# Patient Record
Sex: FEMALE | Race: WHITE | NOT HISPANIC OR LATINO | URBAN - METROPOLITAN AREA
[De-identification: names, ages, dates, MRNs, and addresses within clinical notes are randomized per-mention and may not be internally consistent; named-entity substitution may affect disease eponyms.]

---

## 2020-10-12 ENCOUNTER — TRANSFERRED RECORDS (OUTPATIENT)
Dept: HEALTH INFORMATION MANAGEMENT | Facility: CLINIC | Age: 60
End: 2020-10-12

## 2020-10-23 ENCOUNTER — TRANSCRIBE ORDERS (OUTPATIENT)
Dept: OTHER | Age: 60
End: 2020-10-23

## 2020-10-23 DIAGNOSIS — L20.9 ATOPIC DERMATITIS: Primary | ICD-10-CM

## 2020-12-08 ENCOUNTER — TELEPHONE (OUTPATIENT)
Dept: DERMATOLOGY | Facility: CLINIC | Age: 60
End: 2020-12-08

## 2020-12-08 ENCOUNTER — OFFICE VISIT (OUTPATIENT)
Dept: ALLERGY | Facility: CLINIC | Age: 60
End: 2020-12-08
Payer: COMMERCIAL

## 2020-12-08 ENCOUNTER — OFFICE VISIT (OUTPATIENT)
Dept: DERMATOLOGY | Facility: CLINIC | Age: 60
End: 2020-12-08
Payer: COMMERCIAL

## 2020-12-08 DIAGNOSIS — L56.8 PHOTODERMATITIS: ICD-10-CM

## 2020-12-08 DIAGNOSIS — L20.9 ATOPIC DERMATITIS, UNSPECIFIED TYPE: ICD-10-CM

## 2020-12-08 DIAGNOSIS — L25.9 CONTACT DERMATITIS, UNSPECIFIED CONTACT DERMATITIS TYPE, UNSPECIFIED TRIGGER: Primary | ICD-10-CM

## 2020-12-08 DIAGNOSIS — M54.9 BACK PAIN, UNSPECIFIED BACK LOCATION, UNSPECIFIED BACK PAIN LATERALITY, UNSPECIFIED CHRONICITY: ICD-10-CM

## 2020-12-08 DIAGNOSIS — L30.9 DERMATITIS: Primary | ICD-10-CM

## 2020-12-08 DIAGNOSIS — Z88.9 DRUG ALLERGY: ICD-10-CM

## 2020-12-08 PROBLEM — Z79.899 CONTROLLED SUBSTANCE AGREEMENT SIGNED: Status: ACTIVE | Noted: 2019-04-04

## 2020-12-08 PROCEDURE — 99202 OFFICE O/P NEW SF 15 MIN: CPT | Performed by: DERMATOLOGY

## 2020-12-08 PROCEDURE — 99214 OFFICE O/P EST MOD 30 MIN: CPT | Performed by: DERMATOLOGY

## 2020-12-08 RX ORDER — TRIAMCINOLONE ACETONIDE 1 MG/G
OINTMENT TOPICAL
COMMUNITY
Start: 2020-10-12 | End: 2024-06-10

## 2020-12-08 RX ORDER — PIMECROLIMUS 10 MG/G
CREAM TOPICAL 2 TIMES DAILY
Qty: 60 G | Refills: 3 | Status: SHIPPED | OUTPATIENT
Start: 2020-12-08 | End: 2024-06-10

## 2020-12-08 RX ORDER — AMITRIPTYLINE HYDROCHLORIDE 100 MG/1
TABLET ORAL
COMMUNITY
Start: 2020-12-08

## 2020-12-08 RX ORDER — CRISABOROLE 20 MG/G
OINTMENT TOPICAL
COMMUNITY
Start: 2020-11-13 | End: 2024-06-10

## 2020-12-08 RX ORDER — FLUOCINOLONE ACETONIDE 0.25 MG/G
OINTMENT TOPICAL
COMMUNITY
Start: 2019-12-28 | End: 2024-06-10

## 2020-12-08 RX ORDER — PREDNISONE 10 MG/1
TABLET ORAL
Qty: 10 TABLET | Refills: 0 | Status: SHIPPED | OUTPATIENT
Start: 2020-12-08 | End: 2020-12-12

## 2020-12-08 RX ORDER — GABAPENTIN 300 MG/1
CAPSULE ORAL
COMMUNITY
Start: 2020-11-04 | End: 2024-06-10

## 2020-12-08 RX ORDER — HYDROCODONE BITARTRATE AND ACETAMINOPHEN 5; 325 MG/1; MG/1
1 TABLET ORAL
COMMUNITY
Start: 2019-09-07 | End: 2024-06-10

## 2020-12-08 RX ORDER — MOMETASONE FUROATE 1 MG/G
CREAM TOPICAL DAILY
Qty: 45 G | Refills: 1 | Status: SHIPPED | OUTPATIENT
Start: 2020-12-08 | End: 2024-06-10

## 2020-12-08 RX ORDER — TACROLIMUS 1 MG/G
OINTMENT TOPICAL
COMMUNITY
Start: 2020-08-05 | End: 2024-06-10

## 2020-12-08 RX ORDER — QUETIAPINE FUMARATE 100 MG/1
TABLET, FILM COATED ORAL
COMMUNITY
Start: 2019-03-27 | End: 2024-06-10

## 2020-12-08 RX ORDER — DUPILUMAB 300 MG/2ML
300 INJECTION, SOLUTION SUBCUTANEOUS
COMMUNITY
Start: 2020-11-13

## 2020-12-08 RX ORDER — ACYCLOVIR 400 MG/1
400 TABLET ORAL
COMMUNITY
Start: 2020-06-30 | End: 2024-06-10

## 2020-12-08 RX ORDER — VALACYCLOVIR HYDROCHLORIDE 500 MG/1
500 TABLET, FILM COATED ORAL
COMMUNITY
End: 2024-06-10

## 2020-12-08 RX ORDER — CELECOXIB 200 MG/1
200 CAPSULE ORAL 2 TIMES DAILY
Qty: 60 CAPSULE | Refills: 1 | Status: SHIPPED | OUTPATIENT
Start: 2020-12-08 | End: 2024-06-10

## 2020-12-08 RX ORDER — TRIAMCINOLONE ACETONIDE 1 MG/G
CREAM TOPICAL
COMMUNITY
Start: 2019-08-13 | End: 2024-06-10

## 2020-12-08 ASSESSMENT — PAIN SCALES - GENERAL
PAINLEVEL: NO PAIN (0)
PAINLEVEL: SEVERE PAIN (7)

## 2020-12-08 NOTE — NURSING NOTE
Allergy Rooming Note    Marialuisa Gongora's goals for this visit include:   Chief Complaint   Patient presents with     Allergy Consult     Marialuisa is here for an allergy consult relating to dermatitis.      Aditi Mukherjee LPN

## 2020-12-08 NOTE — TELEPHONE ENCOUNTER
Patient called back she would like to switch to [facility]    Patient also stated she has a copay card one file.  Will inform pharmacy and see if they can call for info or I will call tomorrow to obtain the copay card info for the pharmacy.

## 2020-12-08 NOTE — PROGRESS NOTES
" Dermato-allergology note    Allergy Problem List:    Specialty Problems     None          CC:   Allergy Consult (Marialuisa is here for an allergy consult relating to dermatitis. )    Encounter Date: Dec 8, 2020    History of Present Illness:  I have reviewed the existing informations with patient personally, in Epic and other sources including the nursing intake corresponding to this issue. Marialuisa Gongora is a 60 year old female who presents to the consult  in person.     Reports that she first noticed the rash back in April 2019. Was on her face and anterior neck. Would come and go. Unclear trigger with intermittent response to topicals. Had a biopsy from the neck which revealed \"9/3/19: spongiotic dermatitis with occasional eosinophils\". Underwent extensive patch testing by Dr. Gamez in December 2019 which revealed no strong or mild reactions and doubtful reactions to cetrimonium chloride, methenamine (formaldehyde), formaldehyde 1% (suspected irritants). Since patch testing, has had fluctuation of symptoms. Had some improvement with prednisone, but required higher doses to maintain. Had initial improvement with dupixent, but now losing efficacy. Notes that back in February, she was in [place] for two weeks and no rash then, but upon return to the [place] due to [place] concerns, she had a recurrence of the rash    Currently, described the rash as red, hot, burning, and associated with pain. Only using water and vaseline. Worsened with eucrisa. Denies any other areas affected by rash. Denies any muscle weakness or difficulty walking up/down stairs. Denies any dysphagia. No new medications    Previous patch testing with Dr. Gamez \"patch testing with 287 allergens, including the NACDG standard series, in addition to our corticosteroid, personal care product (general, shampoo/soaps), preservatives, emulsifiers, acrylates, hairdressing series, perfumes, sunscreen panels, as well as select other allergens and personal " "products.\" [place] codes: 275LV8GKK and [id number]    Past Medical History:   Patient Active Problem List   Diagnosis     Controlled substance agreement signed     Spinal stenosis of lumbar region     S/P lumbar spinal fusion     Past Medical History:   Diagnosis Date     Displacement of cervical intervertebral disc      Elevated BP without diagnosis of hypertension      Herpes labialis 04/01/2016     History of anorexia nervosa      History of bilateral mastectomy      Infiltrating ductal carcinoma of left breast (H) 2012     Lyme disease     2012     Pneumothorax, right 12/01/2016       Allergy History:     Allergies   Allergen Reactions     Codeine Itching     Baclofen Other (See Comments)     Minocycline      Penicillins Hives     5-23 had hives as a childhood rxn - HAS HAD CSN in the past without problem  THROAT SWELLS AND HIVES       Sulfa Drugs Unknown       Social History:  The patient works. Patient has the following hobbies or non-occupational exposure: has gone to [facility]     reports that she has never smoked. She has never used smokeless tobacco.      Family History:  No family history on file.    Medications:  Current Outpatient Medications   Medication Sig Dispense Refill     HYDROcodone-acetaminophen (NORCO) 5-325 MG tablet Take 1 tablet by mouth       progesterone (PROMETRIUM) 200 MG capsule        triamcinolone (KENALOG) 0.1 % external cream        acyclovir (ZOVIRAX) 400 MG tablet Take 400 mg by mouth       amitriptyline (ELAVIL) 100 MG tablet        Calcium-Magnesium-Vitamin D 400-166.7-133.3 MG-MG-UNIT TABS        DUPIXENT 300 MG/2ML syringe        EUCRISA 2 % ointment SEBLE EXT AA BID       fluocinolone (SYNALAR) 0.025 % ointment SEBLE AA BID       gabapentin (NEURONTIN) 300 MG capsule TK ONE C PO  BID AND 2 CS PO ONCE D FOR TOTAL DOSE OF 4 CS DAILY       QUEtiapine (SEROQUEL) 100 MG tablet TK SS TO ONE T PO D       tacrolimus (PROTOPIC) 0.1 % external ointment APPLY EXTERNALLY BID       triamcinolone " (KENALOG) 0.1 % external ointment SEBLE EXT TO ARMS BID FOR 14 DAYS       valACYclovir (VALTREX) 500 MG tablet Take 500 mg by mouth         Review of Systems:  -As per HPI  -Constitutional: The patient denies fatigue, fevers, chills, unintended weight loss, and night sweats.  -HEENT: Patient denies nonhealing oral sores.  -Skin: As above in HPI. No additional skin concerns.    Physical exam:  Vitals: There were no vitals taken for this visit.  GEN: This is a well developed, well-nourished female in no acute distress, in a pleasant mood.    Skin: Focused examination of the face within the consultation was performed.   - ill-defined erythema affecting face and neck with some thickening affecting the upper eyelids (left worse than right)  - No additional skin lesions of concern  - upper respiratory tract: currently no obvious Rhinitis  - lungs: no signs for active and present Asthma/Wheezing or coughing  - eyes: currently no active conjunctivits  - GI system/digestion: currently no problems, no bloating or Diarrhoea                Allergy Tests:    Past Allergy Test    Future Allergy Test: 12/8/2020     Order for PATCH TESTS    [x] Outpatient  [] Inpatient: James..../ Bed ....      Skin Atopy (atopic dermatitis) [] Yes   [x] No  Contact allergies:   [] Yes   [x] No  Urticaria/Angioedema  [] Yes   [x] No  Rhinitis/Sinusitis:   [] Yes   [x] No   [] seasonal [] perennial    [] plants?   [] grasses?   [] other?  Allergic Asthma:   [] Yes   [x] No  Pets :                 [x] Yes   [] No - dog  Food Allergy:   [] Yes   [x] No  Drug allergies:               [] Yes   [] No - maybe  Leg ulcers:   [] Yes   [x] No  Hand eczema:   [] Yes   [x] No   Leading hand:     [x] R       [] L       [] Ambidextrous           Reason for tests (suspected allergy): diagnosis of facial erythema (airborne vs photo)  Known previous allergies: no anaphylaxis allergens  Standardized panels  [x] Standard panel (40 tests)  [] Preservatives &  Antimicrobials (31 tests)  [] Emulsifiers & Additives (25 tests)   [x] Perfumes/Flavours & Plants (25 tests)  [] Hairdresser panel (12 tests)  [] Rubber Chemicals (22 tests)  [] Plastics (26 tests)  [] Colorants/Dyes/Food additives (20 tests)  [] Metals (implants/dental) (24 tests)  [x] Local anaesthetics/NSAIDs (13 tests) ==> NSAIDs only and do in double with and without UVA exposure  [] Antibiotics & Antimycotics (14 tests)   [] Corticosteroids (15 tests)   [x] Photopatch test (62 tests)   [x] others:  Seroquel, Gabapentin, Amitryptilin, Progesteron with and without UV exposure      [x] Patient's own products: eucrisa, elidel, ibuprofen/acetaminophen without exposure to UVA    DO NOT test if chemical or biological identity is unknown!     always ask from patient the product information and safety sheets (MSDS)   [x] Atopy screen prick test (Atopic predisposition): ...    Standardized prick panels  [x] Atopic panel (20 tests)  [] Pediatric Panel (12 tests)  [] Milk, Meat, Eggs, Grains (20 tests)   [] Dust, Epithelia, Feathers (10 tests)  [] Fish, Seafood, Shellfish (17 tests)  [] Nuts, Beans (14 tests)  [] Spice, Vegetable, Fruit (17 tests)  [] Others: ...      [] Patient's own products: ...    DO NOT test if chemical or biological identity is unknown!     always ask from patient the product information and safety sheets (MSDS)     Standardized intradermal tests  [x] Penicillium notatum [x] Aspergillus fumigatus [x] House dust mites  [] Bee venom   [] Wasp venom !!Specific protocol with dilutions!!  [] Others: ...    [] Patient needs consultation with Allergy team (changes of tests may apply)  [] Tests discussed with Allergy team (can have direct appointment for allergy tests)     Impression/Plan:    1. 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 Possible atopic predisposition  Based off H&P, presenting condition does appear consistent with an allergic contact dermatitis (photo  "vs airborne). Will plan for patch testing for these conditions with atopic prick/intradermal screening. Will also test patient's medication  Plan   - Will plan for testing (see above)    - Recommended to also bring home medications   - Prescribed short prednisone taper    40mg x1d -> 30mg x1d -> 20mg x1d -> 10mg x1d   - Prescribed mometasone cream daily x5d to rash on face    - Recommended to transition to elidel Mon-Fri and use mometasone on weekends   - Refilled elidel cream    - Recommended to use BID Monday thru Friday after 5 days of mometasone   - Refilled dupixent   - Prescribed compound pharmacy - [facility]      - \"COPPER SULFATE 1%, ZINC SULFATE 1.5% AND CAMPHOR ANTISEPTIC(FCP-8700)    - Dilute stock solution 1:10 in water, keep diluted solution in fridge and use 1-2x daily for compresses   - Photodocumentation obtained in clinic   - Prescribed celecoxib 200mg BID PRN for low back pain    Follow-up: in Derm-Allergy clinic in 1 month    Thank you for the opportunity be involved in the care of this patient.     Staff: Aditi Mukherjee LPN & Marialuisa Liu and Bertha Amin RN    Patient was seen and staffed with attending physician, Dr. Waldrop, who was present the entire visit    Joe Ruiz MD  Med/Derm PGY-3  P: [phone]    Staff attestation:  Staff Physician Comments:  I saw and evaluated the patient with the resident and I agree with the assessment and plan as documented in the resident's note.    Ziyad Stallworth MD  Professor  Head of Dermato-Allergy Division  Department of Dermatology  [facility]      I spent a total of 34 minutes face to face with Marialuisa Gongora during today s office visit. Over 50% of this time was spent counseling the patient and/or coordinating care.  Please see Assessment and Plan for details.    "

## 2020-12-08 NOTE — LETTER
12/8/2020       RE: Marialuisa Gongora  [address]  [place] [address]     Dear Colleague,    Thank you for referring your patient, Marialuisa Gongora, to the [facility] [place] at [facility] Please see a copy of my visit note below.    [facility] Dermatology Note      Dermatology Problem List:  1. Allergic contact dermatitis  - Currently using Dupixent, started in June 2020  - Followed by Dr. Worley with allergy  - In the past used mupirocin, triamcinolone, Eucrisa, Tacrolimus, Pimecrolimus   - Patch testing doubtful reactions to cetrimonium chloride, methenamine (formaldehyde), formaldehyde 1%   2. Positive lyme titer  - On doxycyline 100 mg twice daily for 1 month (s/p 1 week of treatment 12/8/2020)  3. Punch biopsy right lateral neck 9/3/19 spongiotic dermatitis with occasional eosinophils. (outside hospital)    Encounter Date: Dec 8, 2020    CC:  Chief Complaint   Patient presents with     Derm Problem     Marialuisa is here today for a possible rash on her face/neck          History of Present Illness:  Ms. Marialuisa Gongora is a 60 year old female who presents for evaluation of a rash on her face.  She reports that this appeared over a year ago on April 2019 as dry patches on the lower face.  She would treat these with Vaseline and hydrocortisone with intermittent improvement.  At first this rash would come and go but now is always present.  It appears like a sunburn on her face and anterior neck and is very painful.  She notes that her eyes will be swollen shut in the morning, and the rash is intermittently hot almost like a hot flash.She would develop occasional patches on her hands but she does not get any other areas of rash on her body.  She does report fatigue and joint pain that is worse in the morning but denies fever. She denies difficulty swallowing or that there is a family history of autoimmune conditions. She  does not notice any predictability with seasons or exposures that she can pinpoint.  She first saw her GP, who thought it was fungal and prescribed a fungal cream.  However, this was not helpful.  She then went to a dermatologist who thought it was bacterial and prescribed mupirocin, but this was also not helpful for her rash.  She then had TRUE patch testing and patch testing at the [facility] with Dr Gamez, which are listed below  Punch biopsy of the right lateral neck on 9/3/19 demonstrated spongiotic dermatitis. In the past she has also tried Eucrisa, tacrolimus, Pimecrolimus, and triamcinolone, none of which have improved her rash. She denies a personal or family history of seasonal allergies.     Her current flare started past Thursday (12/3/2020).She has a prescription for prednisone 40 mg for this current flare but has not taken this in lieu of her visit here with us today.She notes that she previously took 20 mg of prednisone for previous flares, but this is no longer helpful and has had to increase dose.   Notably, she is currently on doxycycline started 1 week ago for a positive Lyme titer.  TENZIN and CBC performed at that visit were otherwise WNL. She is followed by her allergist, Dr. Worley, who prescribed Dupixent injections every 2 months, starting in June of 2020.  Previously this was helpful for her but now she has had recurrence of rash after Dupixent injections.  She will receive her last injection this coming Friday and will stop. Dr. Worley recommends starting Cyclosporine in the future.        Her current skin care regimen includes Eucerin body wash in the shower, water to wash her face, Vaseline on the face, and CeraVe cream on the body.  She uses a cream rinse and shampoo from her approved list from patch testing.  She gets her hair dyed every 5 weeks, gel nails every 2 weeks, and eyelash extensions at the salon.  She does report that she has not reacted in the past of  these products.    Notably, she reports that this rash is greatly affecting the quality of her life.    The patch testing results were as follows:   Strong / Very Strong Positive Reactions: none   Mild Positive Reactions: none   Doubtful Reactions (possibly irritant): cetrimonium chloride, methenamine (formaldehyde), formaldehyde 1%   Previous TRUE test reaction: hydrocortisone-17-butyrate    Punch biopsy, right lateral neck, 9/3/19: Spongiotic dermatitis.  Histologic sections show a punch biopsy of skin with parakeratosis overlying a spongiotic and mildly acanthotic epidermis. There is exocytosis of lymphocytes, and in the dermis, there is a perivascular infiltrate of lymphocytes with occasional eosinophils. The findings are consistent with an eczematous process.       Past Medical History:   There is no problem list on file for this patient.  History of breast cancer, 9 years in remission, doing well.     History reviewed. No pertinent past medical history.  History reviewed. No pertinent surgical history.    Social History:  Patient reports that she has never smoked. She has never used smokeless tobacco. Recently retired from medical software. Works out 5-6 days a week (runs, bikes, lifts). Takes care of granddaughter. Will be renting a house in [place] for February for two months.    Family History:  History reviewed. No pertinent family history.    Medications:  Current Outpatient Medications   Medication Sig Dispense Refill     acyclovir (ZOVIRAX) 400 MG tablet Take 400 mg by mouth       amitriptyline (ELAVIL) 100 MG tablet        Calcium-Magnesium-Vitamin D 400-166.7-133.3 MG-MG-UNIT TABS        DUPIXENT 300 MG/2ML syringe        gabapentin (NEURONTIN) 300 MG capsule TK ONE C PO  BID AND 2 CS PO ONCE D FOR TOTAL DOSE OF 4 CS DAILY       QUEtiapine (SEROQUEL) 100 MG tablet TK SS TO ONE T PO D       Allergies   Allergen Reactions     Codeine Itching     Baclofen Other (See Comments)     Minocycline       Penicillins Hives     5-23 had hives as a childhood rxn - HAS HAD CSN in the past without problem  THROAT SWELLS AND HIVES       Sulfa Drugs Unknown         Review of Systems:  -Constitutional: Otherwise feeling well today, in usual state of health.  -Skin: As above in HPI. No additional skin concerns.  -As per HPI    Physical exam:  Vitals: There were no vitals taken for this visit.  GEN: This is a well developed, well-nourished female in no acute distress, in a pleasant mood.    SKIN: Focused examination of the face, neck, upper chest, and bilateral hands was performed.  - Shiny red plaques and patches on face and anterior neck including eyelids  -No other lesions of concern on areas examined.       Impression/Plan:  1. Allergic contact dermatitis  - Counseled her that based on her appearance today, her rash appears to continue to be consistent with a severe allergic contact dermatitis.  - Prior to starting prednisone or Cyclosporine, consult Dr. Stallworth today at 10:30.      CC Marisa Worley MD  [facility]  [address] [place] [address] on close of this encounter.  Follow up to be determined after Dr. Stallworth's appointment today    Staff Involved:  I, Alisson Byrnes, MS3, saw and examined the patient in the presence of Dr. Canseco.       I was present with the medical student who participated in the service and in the documentation of the note. I have verified the history and personally performed the physical exam and medical decision making. I agree with the assessment and plan of care as documented in the note.  Ale Canseco MD

## 2020-12-08 NOTE — PROGRESS NOTES
[facility] Dermatology Note      Dermatology Problem List:  1. Allergic contact dermatitis  - Currently using Dupixent, started in June 2020  - Followed by Dr. Worley with allergy  - In the past used mupirocin, triamcinolone, Eucrisa, Tacrolimus, Pimecrolimus   - Patch testing doubtful reactions to cetrimonium chloride, methenamine (formaldehyde), formaldehyde 1%   2. Positive lyme titer  - On doxycyline 100 mg twice daily for 1 month (s/p 1 week of treatment 12/8/2020)  3. Punch biopsy right lateral neck 9/3/19 spongiotic dermatitis with occasional eosinophils. (outside hospital)    Encounter Date: Dec 8, 2020    CC:  Chief Complaint   Patient presents with     Derm Problem     Marialuisa is here today for a possible rash on her face/neck          History of Present Illness:  Ms. Marialuisa Gongora is a 60 year old female who presents for evaluation of a rash on her face.  She reports that this appeared over a year ago on April 2019 as dry patches on the lower face.  She would treat these with Vaseline and hydrocortisone with intermittent improvement.  At first this rash would come and go but now is always present.  It appears like a sunburn on her face and anterior neck and is very painful.  She notes that her eyes will be swollen shut in the morning, and the rash is intermittently hot almost like a hot flash.She would develop occasional patches on her hands but she does not get any other areas of rash on her body.  She does report fatigue and joint pain that is worse in the morning but denies fever. She denies difficulty swallowing or that there is a family history of autoimmune conditions. She does not notice any predictability with seasons or exposures that she can pinpoint.  She first saw her GP, who thought it was fungal and prescribed a fungal cream.  However, this was not helpful.  She then went to a dermatologist who thought it was bacterial and prescribed mupirocin, but this was also not  helpful for her rash.  She then had TRUE patch testing and patch testing at the [facility] with Dr Gamez, which are listed below  Punch biopsy of the right lateral neck on 9/3/19 demonstrated spongiotic dermatitis. In the past she has also tried Eucrisa, tacrolimus, Pimecrolimus, and triamcinolone, none of which have improved her rash. She denies a personal or family history of seasonal allergies.     Her current flare started past Thursday (12/3/2020).She has a prescription for prednisone 40 mg for this current flare but has not taken this in lieu of her visit here with us today.She notes that she previously took 20 mg of prednisone for previous flares, but this is no longer helpful and has had to increase dose.   Notably, she is currently on doxycycline started 1 week ago for a positive Lyme titer.  TENZIN and CBC performed at that visit were otherwise WNL. She is followed by her allergist, Dr. Worley, who prescribed Dupixent injections every 2 months, starting in June of 2020.  Previously this was helpful for her but now she has had recurrence of rash after Dupixent injections.  She will receive her last injection this coming Friday and will stop. Dr. Worley recommends starting Cyclosporine in the future.        Her current skin care regimen includes Eucerin body wash in the shower, water to wash her face, Vaseline on the face, and CeraVe cream on the body.  She uses a cream rinse and shampoo from her approved list from patch testing.  She gets her hair dyed every 5 weeks, gel nails every 2 weeks, and eyelash extensions at the salon.  She does report that she has not reacted in the past of these products.    Notably, she reports that this rash is greatly affecting the quality of her life.    The patch testing results were as follows:   Strong / Very Strong Positive Reactions: none   Mild Positive Reactions: none   Doubtful Reactions (possibly irritant): cetrimonium chloride, methenamine  (formaldehyde), formaldehyde 1%   Previous TRUE test reaction: hydrocortisone-17-butyrate    Punch biopsy, right lateral neck, 9/3/19: Spongiotic dermatitis.  Histologic sections show a punch biopsy of skin with parakeratosis overlying a spongiotic and mildly acanthotic epidermis. There is exocytosis of lymphocytes, and in the dermis, there is a perivascular infiltrate of lymphocytes with occasional eosinophils. The findings are consistent with an eczematous process.       Past Medical History:   There is no problem list on file for this patient.  History of breast cancer, 9 years in remission, doing well.     History reviewed. No pertinent past medical history.  History reviewed. No pertinent surgical history.    Social History:  Patient reports that she has never smoked. She has never used smokeless tobacco. Recently retired from medical software. Works out 5-6 days a week (runs, bikes, lifts). Takes care of granddaughter. Will be renting a house in [place] for February for two months.    Family History:  History reviewed. No pertinent family history.    Medications:  Current Outpatient Medications   Medication Sig Dispense Refill     acyclovir (ZOVIRAX) 400 MG tablet Take 400 mg by mouth       amitriptyline (ELAVIL) 100 MG tablet        Calcium-Magnesium-Vitamin D 400-166.7-133.3 MG-MG-UNIT TABS        DUPIXENT 300 MG/2ML syringe        gabapentin (NEURONTIN) 300 MG capsule TK ONE C PO  BID AND 2 CS PO ONCE D FOR TOTAL DOSE OF 4 CS DAILY       QUEtiapine (SEROQUEL) 100 MG tablet TK SS TO ONE T PO D       Allergies   Allergen Reactions     Codeine Itching     Baclofen Other (See Comments)     Minocycline      Penicillins Hives     5-23 had hives as a childhood rxn - HAS HAD CSN in the past without problem  THROAT SWELLS AND HIVES       Sulfa Drugs Unknown         Review of Systems:  -Constitutional: Otherwise feeling well today, in usual state of health.  -Skin: As above in HPI. No additional skin concerns.  -As  per HPI    Physical exam:  Vitals: There were no vitals taken for this visit.  GEN: This is a well developed, well-nourished female in no acute distress, in a pleasant mood.    SKIN: Focused examination of the face, neck, upper chest, and bilateral hands was performed.  - Shiny red plaques and patches on face and anterior neck including eyelids  -No other lesions of concern on areas examined.       Impression/Plan:  1. Allergic contact dermatitis  - Counseled her that based on her appearance today, her rash appears to continue to be consistent with a severe allergic contact dermatitis.  - Prior to starting prednisone or Cyclosporine, consult Dr. Stallworth today at 10:30.      CC Marisa Worley MD  [facility] ALLERGY AND ASTHMA  [address] [place] [address] on close of this encounter.  Follow up to be determined after Dr. Stallworth's appointment today    Staff Involved:  I, Alisson Byrnes, MS3, saw and examined the patient in the presence of Dr. Canseco.       I was present with the medical student who participated in the service and in the documentation of the note. I have verified the history and personally performed the physical exam and medical decision making. I agree with the assessment and plan of care as documented in the note.  Ale Canseco MD

## 2020-12-08 NOTE — NURSING NOTE
Dermatology Rooming Note    Marialuisa Gongora's goals for this visit include:   Chief Complaint   Patient presents with     Derm Problem     Marialuisa is here today for a possible rash on her face/neck      NITESH Santiago

## 2020-12-08 NOTE — TELEPHONE ENCOUNTER
Prior Authorization Not Needed per Insurance    Medication: Dupixent- No PA Needed  Insurance Company: [employer] ([facility]) - Phone [phone] Fax [phone]  Expected CoPay:      Pharmacy Filling the Rx: [facility] - [place], [place] [facility] [address] [facility]  Pharmacy Notified:    Patient Notified:      Per test claim $60.00 copay, patient has been filling with [employer].  Left message for patient to inform she is eligible to fill with our [facility] and see if she would like to fill with [facility] instead.  Order is in [facility] will release tomorrow to [employer] if she does not call back.

## 2020-12-11 ENCOUNTER — TELEPHONE (OUTPATIENT)
Dept: DERMATOLOGY | Facility: CLINIC | Age: 60
End: 2020-12-11

## 2020-12-11 DIAGNOSIS — Z88.9 DRUG ALLERGY: Primary | ICD-10-CM

## 2020-12-11 DIAGNOSIS — L20.89 OTHER ATOPIC DERMATITIS: ICD-10-CM

## 2020-12-11 RX ORDER — GABAPENTIN 100 MG/1
CAPSULE ORAL
Qty: 1 CAPSULE | Refills: 0 | Status: SHIPPED | OUTPATIENT
Start: 2021-01-11

## 2020-12-11 RX ORDER — AMITRIPTYLINE HYDROCHLORIDE 10 MG/1
TABLET ORAL
Qty: 1 TABLET | Refills: 0 | Status: SHIPPED | OUTPATIENT
Start: 2021-01-11

## 2020-12-11 RX ORDER — QUETIAPINE FUMARATE 200 MG/1
TABLET, FILM COATED ORAL
Qty: 1 TABLET | Refills: 0 | Status: SHIPPED | OUTPATIENT
Start: 2021-01-11 | End: 2024-06-10

## 2020-12-11 NOTE — TELEPHONE ENCOUNTER
Standardized panels  [x]? Standard panel (40 tests)  []? Preservatives & Antimicrobials (31 tests)  []? Emulsifiers & Additives (25 tests)   [x]? Perfumes/Flavours & Plants (25 tests)  []? Hairdresser panel (12 tests)  []? Rubber Chemicals (22 tests)  []? Plastics (26 tests)  []? Colorants/Dyes/Food additives (20 tests)  []? Metals (implants/dental) (24 tests)  [x]? Local anaesthetics/NSAIDs (13 tests) ==> NSAIDs only and do in double with and without UVA exposure  []? Antibiotics & Antimycotics (14 tests)   []? Corticosteroids (15 tests)   [x]? Photopatch test (62 tests)   [x]? others:  Seroquel, Gabapentin, Amitryptilin, Progesteron with and without UV exposure                      [x]? Patient's own products: eucrisa, elidel, ibuprofen/acetaminophen without exposure to UVA    DO NOT test if chemical or biological identity is unknown!     always ask from patient the product information and safety sheets (MSDS)   [x]? Atopy screen prick test (Atopic predisposition): ...  Standardized prick panels  [x]? Atopic panel (20 tests)  []? Pediatric Panel (12 tests)  []? Milk, Meat, Eggs, Grains (20 tests)   []? Dust, Epithelia, Feathers (10 tests)  []? Fish, Seafood, Shellfish (17 tests)  []? Nuts, Beans (14 tests)  []? Spice, Vegetable, Fruit (17 tests)  []? Others: ...                      []? Patient's own products: ...    DO NOT test if chemical or biological identity is unknown!     always ask from patient the product information and safety sheets (MSDS)   Standardized intradermal tests  [x]? Penicillium notatum           [x]? Aspergillus fumigatus        [x]? House dust mites  []? Bee venom                         []? Wasp venom          !!Specific protocol with dilutions!!  []? Others: ...  []? Patient needs consultation with Allergy team (changes of tests may apply)  []? Tests discussed with Allergy team (can have direct appointment for allergy tests)   Impression/Plan:  1. Drug-induced photodermatitis, possible  "DDx photoallergic contact dermatitis  2. Possible atopic predisposition  Based off H&P, presenting condition does appear consistent with an allergic contact dermatitis (photo vs airborne). Will plan for patch testing for these conditions with atopic prick/intradermal screening. Will also test patient's medication  Plan              - Will plan for testing (see above)                          - Recommended to also bring home medications              - Prescribed short prednisone taper                          40mg x1d -> 30mg x1d -> 20mg x1d -> 10mg x1d              - Prescribed mometasone cream daily x5d to rash on face                          - Recommended to transition to elidel Mon-Fri and use mometasone on weekends              - Refilled elidel cream                          - Recommended to use BID Monday thru Friday after 5 days of mometasone              - Refilled dupixent              - Prescribed compound pharmacy - [facility]                            - \"COPPER SULFATE 1%, ZINC SULFATE 1.5% AND CAMPHOR ANTISEPTIC([id number])                          - Dilute stock solution 1:10 in water, keep diluted solution in fridge and use 1-2x daily for compresses              - Photodocumentation obtained in clinic              - Prescribed celecoxib 200mg BID PRN for low back pain  Follow-up: in Derm-Allergy clinic in 1 month        STANDARD Series      No Substance 2 days 4 days remarks   1 Ganga Mix [C] - -    2 Colophony - -    3  2-Mercaptobenzothiazole  - -     4 Methylisothiazolinone - -    5 Carba Mix - -    6 Thiuram Mix [A] - -    7 Bisphenol A Epoxy Resin - -    8 N-Vwhx-Rejlfliabbq-Formaldehyde Resin - -    9 Mercapto Mix [A] - -    10 Black Rubber Mix- PPD [B] - -    11 Potassium Dichromate  -  -    12 Balsam of Peru (Myroxylon Pereirae Resin) - -    13 Nickel Sulphate Hexahydrate - -    14 Mixed Dialkyl Thiourea - -    15 Paraben Mix [B] - -    16 Methyldibromo Glutaronitrile - -    17 Fragrance " Mix - -    18 2-Bromo-2-Nitropropane-1,3-Diol (Bronopol) - -    19 Lyral - -    20 Tixocortol-21- Pivalate - -    21 Diazolidiyl Urea (Germall II) - -    22 Methyl Methacrylate - -    23 Cobalt (II) Chloride Hexahydrate - -    24 Fragrance Mix II  - -    25 Compositae Mix - -    26 Benzoyl Peroxide - -    27 Bacitracin - -    28 Formaldehyde - -    29 Methylchloroisothiazolinone / Methylisothiazolinone - -    30 Corticosteroid Mix - -    31 Sodium Lauryl Sulfate - -    32 Lanolin Alcohol - -    33 Turpentine - -    34 Cetylstearylalcohol - -    35 Chlorhexidine Dicluconate - -    36 Budenoside - -    37 Imidazolidinyl Urea  - -    38 Ethyl-2 Cyanoacrylate - -    39 Quaternium 15 (Dowicil 200) - -    40 Decyl Glucoside - -      PERFUMES, FLAVORS & PLANTS      No Substance 2 days 4 days remarks   41 1 Benzyl Cinnamate - -    42 2 Di-Limonene (Dipentene) - -    43 3 Cananga Odorata (Alphonse Cunha) (I) - -    44 4 Lichen Acid Mix - -    45 5 Mentha Piperita Oil (Peppermint Oil) - -    46 6 Sesquiterpenelactone mix - -    47 7 Tea Tree Oil, Oxidized - -    48 8 Wood Tar Mix - -    49 9 Abietic Acid - -    50 10 Lavendula Angustifolia Oil (Lavender Oil) - -    51 11 Camphor  - -      Fragrance Mix I      52 12 Oakmoss Absolute - -    53 13 Eugenol - -    54 14 Geraniol - -    55 15 Hydroxycitronellal - -    56 16 Isoeugenol - -    57 17 Cinnamic Aldehyde - -    58 18 Cinnamic Alcohol  - -      Fragrance mix II      59 19 Citronellol - -    60 20 Alpha-Hexylcinnamic Aldehyde    - -    61 21 Citral - -    62 22 Farnesol - -    63 23 Coumarin - -      LOCAL ANESTHETICS / NSAIDs    No Substance 2 days 4 days remarks     NSAIDs      64 8 Diclofenac - -    65 9 Ibuprofen - -    66 10 Ketoprofen - -    67 11 Paracetamol - -    68 12 Acetylsalicylic acid - -    69 13 Peroxicam      70 14 Naproxen - -      PHOTOPATCH tests (unexposed)      No Substance 2 days 4 days remarks   71 1 3,4,4'-Triclocarban - -    72 2 Coumarin - -    73 3  Bithiniol - -    74 4 Usnic Acid - -    75 5 Compositae Mix - -    76 6 Wood Tar Mix - -    77 7 Balsam of Peru (Myroxylon Pereirae Resin) - -    78 8 Hexachlorophene - -    79 9 Chlorhexidine Digluconate - -    80 10 Triclosan - -    81 11 Fragrance Mix - -    82 12 Ketoprofen  - -    83 13 Lichen Acid Mix - -    84 14 Musk Ambrette - -      Sunscreens (UV filters)  -    85 15 P-Aminobenzoic Acid - -    86 16 2-Hydroxy-4-Methoxy Benzophenone (Oxybenzone) - -    87 17 Tert-Butyl-Methoxydibenzoyl Methane  - -    88 18 3-4-Mehyl Benzyliden Camphor  - -    89 19 2-Ethylhexyl-P (Dimethylamino) Benzoate - -    90 20 7-Lgnaeypa-9-Methoxy-4-Methyl Benzophenone - -    91 21 Benzyl Salicylate - -    92 22 Isoamyl-4-Methoxycinnamate - -    93 23 Phenyl Benzimidazole - 5 - Sulfonic Acid - -    94 24 1-Yiwxqwseogcuz-1-Hydroxybenzoyl-Benzoic Acid Hexyl Melinda - -    95 25 Menthyl Anthranilate (Meradimate) - -    96 26 Bis-Ethylhexyloxyphenol-Methoxy Phenyl Triazine - -    97 27 Diethylhexyl Butamido Triazone - -    98 28 Disodium Phenyldibenzimidazole Tetrasulfonate - -    99 29 Octocrylene - -    100 30 Octyl Triazone - -    101 31 Tinsorb (Methylene - Bis - Benzotriazyl Tetramethylbutylphenol) - -        Patch Tests   as is  as is 1:2 paraffin 1:2 paraffin       Substance Conc  days  days  days  days remarks   102 103 Seroquel         104 105 Gabapentin         106 107 Amitryptilin         108 109 Progesterone                      OTHER PRODUCTS      No Substance Conc  % solv 2 days 4 days remarks   110 1           PHOTOPATCH tests (exposed)      No Substance 2 days 4 days remarks   111 1 3,4,4'-Triclocarban - -    112 2 Coumarin - -    113 3 Bithiniol - -    114 4 Usnic Acid - -    115 5 Compositae Mix - -    116 6 Wood Tar Mix - -    117 7 Balsam of [place] (Myroxylon Pereirae Resin) - -    118 8 Hexachlorophene - -    119 9 Chlorhexidine Digluconate - -    120 10 Triclosan - -    121 11 Fragrance Mix - -    122 12 Ketoprofen  -  -    123 13 Lichen Acid Mix - -    124 14 Musk Ambrette - -      Sunscreens (UV filters)  -    125 15 P-Aminobenzoic Acid - -    126 16 2-Hydroxy-4-Methoxy Benzophenone (Oxybenzone) - -    127 17 Tert-Butyl-Methoxydibenzoyl Methane  - -    128 18 3-4-Mehyl Benzyliden Camphor  - -    129 19 2-Ethylhexyl-P (Dimethylamino) Benzoate - -    130 20 3-Zjljesns-0-Methoxy-4-Methyl Benzophenone - -    131 21 Benzyl Salicylate - -    132 22 Isoamyl-4-Methoxycinnamate - -    133 23 Phenyl Benzimidazole - 5 - Sulfonic Acid - -    134 24 0-Uorhrpjdoyhkz-5-Hydroxybenzoyl-Benzoic Acid Hexyl Melinda - -    135 25 Menthyl Anthranilate (Meradimate) - -    136 26 Bis-Ethylhexyloxyphenol-Methoxy Phenyl Triazine - -    137 27 Diethylhexyl Butamido Triazone - -    138 28 Disodium Phenyldibenzimidazole Tetrasulfonate - -    139 29 Octocrylene - -    140 30 Octyl Triazone - -    141 31 Tinsorb (Methylene - Bis - Benzotriazyl Tetramethylbutylphenol) - -    Procedure: Apply the same photopatch series 2  times on the back. Remove one patch side for irradiation after 1 day and then irradiate this site with 10 J/cm2 UVA. Remove the other, non-irradiated patch sites on day 2.   Maybe perform on these patients as well standardized UV-B and UV-A MED tests.      Patch Tests   as is  as is 1:2 paraffin 1:2 paraffin       Substance Conc  days  days  days  days remarks   142 143 Seroquel         144 145 Gabapentin         146 147 Amitryptilin         148 149 Progesterone                      OTHER PRODUCTS      No Substance Conc  % solv 2 days 4 days remarks   150 1           Results of patch tests:                         Interpretation:    - Negative                    A    = Allergic      (+) Erythema    TI   = Toxic/irritant   + E + Infiltration    RaP = Relevance at Present     ++ E/I + Papulovesicle   Rpr  = Relevance Previously     +++ E/I/P + Blister     nR   = No Relevance          Atopy Screen     No Substance Readings (15 min) Evaluation   POS  Histamine 1mg/ml -    NEG NaCl 0.9% -      No Substance Readings (15 min) Evaluation   1 Alternaria alternata (tenuis)  -    2 Cladosporium herbarum -    3 Aspergillus fumigatus -    4 Penicillium notatum -    5 Dermatophagoides pteronyssinus -    6 Dermatophagoides farinae -    7 Dog epithelium (canis spp) -    8 Cat hair (bridget catus) -    9 Cockroach   (Blatella americana & germanica) -    10 Grass mix midwest   (Deedee, Orchard, Redtop, Joe) -    11 Raul grass (sorghum halepense) -    12 Weed mix   (common Cocklebur, Lamb s quarters, rough redroot Pigweed, Dock/Sorrel) -    13 Mug wort (artemisia vulgare) -    14 Ragweed giant/short (ambrosia spp) -    15 English Plantain (plantago lanceolata) -    16 Tree mix 1 (Pecan, Maple BHR, Oak RVW, american Marmaduke, black [place]) -    17 Red cedar (juniperus virginia) -    18 Tree mix 2   (white Giorgi, river/red Birch, black Brewster, common [place], american Elm) -    19 Box elder/Maple mix (acer spp) -    20 Hollister shagbark (carya ovata) -       -      Conclusion:      PATIENTS OWN      DRUGS/SUBSTANCES      Prick Tests         Substance/ Allergen Concentration Result (15min) Remarks   1 Seroquel      2 Gabapentin      3 Amitryptilin      4 Progesterone                Intradermal Tests   immed immed delayed delayed      Substance Conc 1st dil 2nd dil   days  days remarks   - NaCl 0.9%        1 Seroquel         2 Gabapentin         3 Amitryptilin         4 Progesterone

## 2020-12-11 NOTE — TELEPHONE ENCOUNTER
Health Call Center    Phone Message    May a detailed message be left on voicemail: no     Reason for Call: Medication Question or concern regarding medication   Prescription Clarification    Name of Medication: Dupilumab (DUPIXENT) 300 MG/2ML syringe    Prescribing Provider: Dr Stallworth     Pharmacy: [facility] - [place], [place] - [facility] [address]    Direct pharmacy phone: [phone]  Fax [phone]   What on the order needs clarification?  Pt is filling Dupixent for the 1st time. Pt indicates that the Dr and Pt agreed to dosing with Injection every 10 days, but prescription has dosing listed as every 14 days.  Please update prescription and resend if appropriate            Action Taken: Message routed to:  [facility] Dermatology    Travel Screening: Not Applicable

## 2020-12-15 NOTE — TELEPHONE ENCOUNTER
Pharm called and wanted to know if Dr. Stallworth wants to go to every 10 days instead of every 14 days for the pt's dupixent. Please call them back. Thanks

## 2020-12-16 ENCOUNTER — TELEPHONE (OUTPATIENT)
Dept: DERMATOLOGY | Facility: CLINIC | Age: 60
End: 2020-12-16

## 2020-12-16 DIAGNOSIS — L20.89 OTHER ATOPIC DERMATITIS: Primary | ICD-10-CM

## 2020-12-16 NOTE — TELEPHONE ENCOUNTER
[facility] Call Center    Phone Message    May a detailed message be left on voicemail: yes     Reason for Call: Medication Refill Request    Has the patient contacted the pharmacy for the refill? Yes   Name of medication being requested: Dupilumab (DUPIXENT) 300 MG/2ML : Inject 2 mLs (300 mg) Subcutaneous every 10 days for 8 doses - Subcutaneous  Provider who prescribed the medication: Dr Stallworth  Pharmacy: [facility] - [facility] [address]   Date medication is needed: ASAP   Pharmacy calling back and can see the prescription but something is holding it back Please give verbal or send new Prescription again    Thank you,      Action Taken: Message routed to:  [facility] Allergy    Travel Screening: Not Applicable

## 2021-01-05 ENCOUNTER — OFFICE VISIT (OUTPATIENT)
Dept: DERMATOLOGY | Facility: CLINIC | Age: 61
End: 2021-01-05
Payer: COMMERCIAL

## 2021-01-05 DIAGNOSIS — L30.9 ECZEMA, UNSPECIFIED TYPE: Primary | ICD-10-CM

## 2021-01-05 PROBLEM — Z79.899 CONTROLLED SUBSTANCE AGREEMENT SIGNED: Chronic | Status: ACTIVE | Noted: 2019-04-04

## 2021-01-05 PROBLEM — Z86.59 HISTORY OF ANOREXIA NERVOSA: Status: ACTIVE | Noted: 2019-04-26

## 2021-01-05 PROBLEM — Z86.59 HISTORY OF ANOREXIA NERVOSA: Chronic | Status: ACTIVE | Noted: 2019-04-26

## 2021-01-05 PROCEDURE — 99214 OFFICE O/P EST MOD 30 MIN: CPT | Mod: GC | Performed by: DERMATOLOGY

## 2021-01-05 RX ORDER — DESOXIMETASONE 0.5 MG/G
OINTMENT TOPICAL 2 TIMES DAILY PRN
Qty: 60 G | Refills: 3 | Status: SHIPPED | OUTPATIENT
Start: 2021-01-05

## 2021-01-05 ASSESSMENT — PAIN SCALES - GENERAL: PAINLEVEL: MODERATE PAIN (5)

## 2021-01-05 NOTE — NURSING NOTE
Chief Complaint   Patient presents with     Derm Problem     Marialuisa, is here for her atopic dermatitis. Rash has it's mind of it's own. Pretty bad on estuardo, Today has gotten bad as well per pt.        Jas Alejandro EMT

## 2021-01-05 NOTE — LETTER
1/5/2021       RE: Marialuisa Gongora  [address]  [place] [address]     Dear Colleague,    Thank you for referring your patient, Marialuisa Gongora, to the [facility] [place] at [facility]. Please see a copy of my visit note below.    [facility] Dermatology Note - Established Patient Visit    Dermatology Problem List:  1. Dermatitis - allergic vs contact  - using Dupixent since June 2020  - Followed by Dr. Worley with allergy  - In the past used mupirocin, triamcinolone, Eucrisa, Tacrolimus, Pimecrolimus   - Patch testing doubtful reactions to cetrimonium chloride, methenamine (formaldehyde), formaldehyde 1%   2. Positive Lyme serology: now s/p doxycycline 100 mg BID x4 weeks (December 2020), no changes in rash  3. Punch biopsy right lateral neck 9/3/19 spongiotic dermatitis with occasional eosinophils (outside hospital)    Encounter Date: Jan 5, 2021    CC:   Chief Complaint   Patient presents with     Derm Problem     Marialuisa, is here for her atopic dermatitis. Rash has it's mind of it's own. Pretty bad on estuardo, Today has gotten bad as well per pt.      History of Present Illness:  Ms. Marialuisa Gongora is a  60 year old female who presents for further evaluation of facial rash.     She has been seen for this by multiple specialists over the past 2 years, including Dermatologists and Allergists. She reports that she returns today because she hopes to know what causes the rash and to find a cure for it.     Has had patch testing (see previous notes), punch biopsy in 9/2019 that showed spongiotic dermatitis. Reports that she has had improvement with systemic steroids in the past (dose-dependent), some improvement with Dupixent (mostly noticed worsening just prior to the next dose).    Unsuccessful treatments include PO benadryl, Eucrisa (worsened rash), tacrolimus, pimecrolimus, triamcinolone, mometasone, and most recently  "completed 4 weeks of PO doxycyline and compresses with medication compounded by pharmacy (see below).     Has photo-patch testing scheduled with Dr. Stallworth next week.    She has changed all her beauty/hygiene/laundry products per previous patch results w/o changes in symptoms.     She endorses episodes of neck and facial rash that begin with a feeling of burning \"from the inside out\". She starts with a few erythematous patches, which increase in number, extend, and eventually coalesce. The affected skin also becomes hot and painful, like a burn, but without tenderness. She notes it feels like menopause hot flashes she experienced in the past.   Episodes begin and subside spontaneously, lasting from 2-3 hours up to 24.  She has pictures in her cell phone that show the above sequence happening within approximately 2 hours.   Exercise and sun exposure do not cause flares/rash.   Episodes seem to occur more frequently at night, has not noticed changes with weather/location (also spends time in [place]).     She notes that her face had been doing well for the past 3-4 days, but developed the rash again today. She has noticed that whenever she gets an erythematous patch on the right side of her nose, she later develops the full rash. The nose patch has been present for 2 weeks.   Except for the past 3-4 days, she has been having the rash on a daily basis.   Denies rash elsewhere. No muscle weakness, joint stiffness/sweling, fatigue, weight loss, loss of appetite, fevers/chills, dysphagia.     She is also taking progesterone, quetiapine and amitriptyline for sleep, gabapentin for chronic back pain.     Past Medical History:   Patient Active Problem List   Diagnosis     Controlled substance agreement signed     Spinal stenosis of lumbar region     S/P lumbar spinal fusion     Past Medical History:   Diagnosis Date     Displacement of cervical intervertebral disc      Elevated BP without diagnosis of hypertension      " "Herpes labialis 04/01/2016     History of anorexia nervosa      History of bilateral mastectomy      Infiltrating ductal carcinoma of left breast (H) 2012     Lyme disease     2012     Pneumothorax, right 12/11/2016     Past Surgical History:   Procedure Laterality Date     BACK SURGERY      C4-5 fusion; L4-5 diskectomy/laminectomy x 2; L3-S1 fusion;     LAPAROTOMY EXPLORATORY      r oophorectomy     MASTECTOMY MODIFIED RADICAL Bilateral      Social History:  The patient works in [employer]. 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 The patient denies use of tanning beds. [place], admits to excessive sun exposure in her youth.    Family History:  No significant history     Medications:  Current Outpatient Medications   Medication Sig Dispense Refill     acyclovir (ZOVIRAX) 400 MG tablet Take 400 mg by mouth       [START ON 1/11/2021] amitriptyline (ELAVIL) 10 MG tablet For allergy testing 1 tablet 0     amitriptyline (ELAVIL) 100 MG tablet        Calcium-Magnesium-Vitamin D 400-166.7-133.3 MG-MG-UNIT TABS        celecoxib (CELEBREX) 200 MG capsule Take 1 capsule (200 mg) by mouth 2 times daily 60 capsule 1     COMPOUNDED NON-CONTROLLED SUBSTANCE (CMPD RX) - PHARMACY TO MIX COMPOUNDED MEDICATION [employer] use \"COPPER SULFATE 1%, ZINC SULFATE 1.5% AND CAMPHOR ANTISEPTIC(FCP-8700): dilute stock solution 1:10 in water, keep diluted solution in fridge and use 1-2x daily for compresses 500 mL 3     Desoximetasone (TOPICORT LP) 0.05 % OINT Externally apply topically 2 times daily as needed 60 g 3     Dupilumab (DUPIXENT) 300 MG/2ML syringe Inject 2 mLs (300 mg) Subcutaneous every 14 days for 10 doses 4 mL 2     Dupilumab (DUPIXENT) 300 MG/2ML syringe Inject 2 mLs (300 mg) Subcutaneous every 10 days for 8 doses 4 mL 2     Dupilumab (DUPIXENT) 300 MG/2ML syringe Inject 2 mLs (300 mg) Subcutaneous every 14 days 4 mL 2     DUPIXENT 300 MG/2ML syringe        fluocinolone (SYNALAR) 0.025 % " ointment SEBLE AA BID       [START ON 1/11/2021] gabapentin (NEURONTIN) 100 MG capsule For allergy testing 1 capsule 0     gabapentin (NEURONTIN) 300 MG capsule TK ONE C PO  BID AND 2 CS PO ONCE D FOR TOTAL DOSE OF 4 CS DAILY       HYDROcodone-acetaminophen (NORCO) 5-325 MG tablet Take 1 tablet by mouth       mometasone (ELOCON) 0.1 % external cream Apply topically daily 45 g 1     pimecrolimus (ELIDEL) 1 % external cream Apply topically 2 times daily 60 g 3     [START ON 1/11/2021] progesterone (PROMETRIUM) 200 MG capsule For allergy testing 1 capsule 0     progesterone (PROMETRIUM) 200 MG capsule        QUEtiapine (SEROQUEL) 100 MG tablet TK SS TO ONE T PO D       [START ON 1/11/2021] QUEtiapine (SEROQUEL) 200 MG tablet For allergy testing 1 tablet 0     valACYclovir (VALTREX) 500 MG tablet Take 500 mg by mouth       EUCRISA 2 % ointment SEBLE EXT AA BID       tacrolimus (PROTOPIC) 0.1 % external ointment APPLY EXTERNALLY BID       triamcinolone (KENALOG) 0.1 % external cream        triamcinolone (KENALOG) 0.1 % external ointment SEBLE EXT TO ARMS BID FOR 14 DAYS          Allergies:  Allergies   Allergen Reactions     Codeine Itching     Baclofen Other (See Comments)     Minocycline      Other Environmental Allergy      Patch Test Results 12-10-19    Very Strong/Strong  None    Mild  None    Borderline (poessible irritation, may not be allergic)  Centrimonium chloride  Methenamine (formaldehyde)  Formaldehyde     Penicillins Hives     5-23 had hives as a childhood rxn - HAS HAD CSN in the past without problem  THROAT SWELLS AND HIVES       Sulfa Drugs Unknown       Review of Systems:  Constitutional: No recent fevers, chills, night sweats.  Skin: As above in HPI. Otherwise no additional skin rashes or changes.    Physical exam:  Vitals: There were no vitals taken for this visit.  Vitals and nursing notes reviewed  GEN: This is a well developed, well-nourished female in no acute distress, in a pleasant mood.    SKIN:  "Sun-exposed skin, which includes the head/face, neck, and distal arms and nails was examined.   - diffuse erythema on face, particularly forehead, cheeks and ears, without defined borders. There is a 1cm area of \"deeper\" erythema on right side of the nose, with hyperkeratosis. Multiple small erythematous macules noted on anterior neck/upper chest.   - No other lesions of concern on areas examined.     Impression/Plan:    1. Eczematous dermatitis, face/neck. Prior bx in 2019 c/w spongiotic dermatitis. S/p patch testing at [facility] with  cetrimonium chloride, methenamine (formaldehyde), formaldehyde. On exam today and per history, appears most consistent with airborne or photo-allergic contact dermatitis, especially with prior supportive skin biopsy. Though, patient does have some more episodic, transient symptoms with differential including urticaria, less likely symptomatic flushing. Recommended photo and drug patch testing with Dr. Stallworth as planned. Did discuss possibility of oral immunosuppressives such as mycophenolate or methotrexate if patch test not revealing and/or rash continues despite attempts to avoid potential allergens.       Proceed with patch and prick testing as scheduled with Dr. Stallworth    F/u end of march 2021 (as she will be going to [place] until then)    Will trial Topicort 0.05% ointment BID PRN until follow-up    Consider workup for symptomatic flushing     Dr. Trevon Ayala staffed the patient.    Veena Power MD  Family Medicine Resident [facility], PGY-3  Phone: [phone]    Staff Involved:  Resident (Veena Power MD) and Staff (as above)    Staff Physician Comments:   I saw and evaluated the patient with the resident and I agree with the assessment and plan.  I was present for the examination.    Trevon Ayala MD  Pronouns: he/him/his    Department of Dermatology  [facility]: Phone: [phone], " Fax:[phone]  [facility]: Phone: [phone] Fax: [phone]

## 2021-01-10 NOTE — PROGRESS NOTES
"Note for return visit for Dermato-Allergy       Encounter Date: Jan 11, 2021    History of Present Illness:  Marialuisa Gongora is a 60 year old female who presents in person to the consult following information has been take directly from the prior notes, patients informations, Epic and/or other sources and exam/history performed by myself:       Additional comments and observations from review of the patient s chart including the following:    See notes for more details    ROS: Patient generally feeling well today   Physical Examination:  General: Well-appearing, appropriately-developed individual.  - Skin: Focused examination of the skin on face and back within the consultation was performed  On back no signs for eczema  picture from end December in the evening (starting 8pm) with erythema and infiltration over cheek area and forehead and by 11pm gone. But sometimes it stayed days.  As above in HPI. No additional skin concerns.  - upper respiratory tract: currently no obvious Rhinitis  - lungs: no signs for active and present Asthma/Wheezing or coughing  - eyes: currently no active conjunctivits  - GI system/digestion: currently no problems, no bloating or Diarrhoea      Earlier History and Allergy exams:    Reports that she first noticed the rash back in April 2019. Was on her face and anterior neck. Would come and go. Unclear trigger with intermittent response to topicals. Had a biopsy from the neck which revealed \"9/3/19: spongiotic dermatitis with occasional eosinophils\". Underwent extensive patch testing by Dr. Gamez in December 2019 which revealed no strong or mild reactions and doubtful reactions to cetrimonium chloride, methenamine (formaldehyde), formaldehyde 1% (suspected irritants). Since patch testing, has had fluctuation of symptoms. Had some improvement with prednisone, but required higher doses to maintain. Had initial improvement with dupixent, but now losing efficacy. Notes that back in February, " "she was in [place] for two weeks and no rash then, but upon return to the [place] due to [place] concerns, she had a recurrence of the rash    Currently, described the rash as red, hot, burning, and associated with pain. Only using water and vaseline. Worsened with eucrisa. Denies any other areas affected by rash. Denies any muscle weakness or difficulty walking up/down stairs. Denies any dysphagia. No new medications    Previous patch testing with Dr. Gamez \"patch testing with 287 allergens, including the NACDG standard series, in addition to our corticosteroid, personal care product (general, shampoo/soaps), preservatives, emulsifiers, acrylates, hairdressing series, perfumes, sunscreen panels, as well as select other allergens and personal products.\" [place] codes: 126CB8GYR and [id number]    Past Medical History:   Patient Active Problem List   Diagnosis     Controlled substance agreement signed     Spinal stenosis of lumbar region     S/P lumbar spinal fusion     History of anorexia nervosa     Elevated BP without diagnosis of hypertension     Past Medical History:   Diagnosis Date     Displacement of cervical intervertebral disc      Elevated BP without diagnosis of hypertension      Herpes labialis 04/01/2016     History of anorexia nervosa      History of bilateral mastectomy      Infiltrating ductal carcinoma of left breast (H) 2012     Lyme disease     2012     Pneumothorax, right 12/11/2016       Allergy History:     Allergies   Allergen Reactions     Codeine Itching     Baclofen Other (See Comments)     Minocycline      Other Environmental Allergy      Patch Test Results 12-10-19    Very Strong/Strong  None    Mild  None    Borderline (poessible irritation, may not be allergic)  Centrimonium chloride  Methenamine (formaldehyde)  Formaldehyde     Penicillins Hives     5-23 had hives as a childhood rxn - HAS HAD CSN in the past without problem  THROAT SWELLS AND HIVES       Sulfa Drugs Unknown       Social " "History:  The patient works. Patient has the following hobbies or non-occupational exposure: has gone to [facility]     reports that she has never smoked. She has never used smokeless tobacco.      Family History:  No family history on file.    Medications:  Current Outpatient Medications   Medication Sig Dispense Refill     acyclovir (ZOVIRAX) 400 MG tablet Take 400 mg by mouth       [START ON 1/11/2021] amitriptyline (ELAVIL) 10 MG tablet For allergy testing 1 tablet 0     amitriptyline (ELAVIL) 100 MG tablet        Calcium-Magnesium-Vitamin D 400-166.7-133.3 MG-MG-UNIT TABS        celecoxib (CELEBREX) 200 MG capsule Take 1 capsule (200 mg) by mouth 2 times daily 60 capsule 1     COMPOUNDED NON-CONTROLLED SUBSTANCE (CMPD RX) - PHARMACY TO MIX COMPOUNDED MEDICATION [facility] \"COPPER SULFATE 1%, ZINC SULFATE 1.5% AND CAMPHOR ANTISEPTIC(FCP-8700): dilute stock solution 1:10 in water, keep diluted solution in fridge and use 1-2x daily for compresses 500 mL 3     Desoximetasone (TOPICORT LP) 0.05 % OINT Externally apply topically 2 times daily as needed 60 g 3     Dupilumab (DUPIXENT) 300 MG/2ML syringe Inject 2 mLs (300 mg) Subcutaneous every 14 days for 10 doses 4 mL 2     Dupilumab (DUPIXENT) 300 MG/2ML syringe Inject 2 mLs (300 mg) Subcutaneous every 10 days for 8 doses 4 mL 2     Dupilumab (DUPIXENT) 300 MG/2ML syringe Inject 2 mLs (300 mg) Subcutaneous every 14 days 4 mL 2     DUPIXENT 300 MG/2ML syringe        EUCRISA 2 % ointment SEBLE EXT AA BID       fluocinolone (SYNALAR) 0.025 % ointment SEBLE AA BID       [START ON 1/11/2021] gabapentin (NEURONTIN) 100 MG capsule For allergy testing 1 capsule 0     gabapentin (NEURONTIN) 300 MG capsule TK ONE C PO  BID AND 2 CS PO ONCE D FOR TOTAL DOSE OF 4 CS DAILY       HYDROcodone-acetaminophen (NORCO) 5-325 MG tablet Take 1 tablet by mouth       mometasone (ELOCON) 0.1 % external cream Apply topically daily 45 g 1     pimecrolimus (ELIDEL) 1 % external cream Apply " topically 2 times daily 60 g 3     [START ON 1/11/2021] progesterone (PROMETRIUM) 200 MG capsule For allergy testing 1 capsule 0     progesterone (PROMETRIUM) 200 MG capsule        QUEtiapine (SEROQUEL) 100 MG tablet TK SS TO ONE T PO D       [START ON 1/11/2021] QUEtiapine (SEROQUEL) 200 MG tablet For allergy testing 1 tablet 0     tacrolimus (PROTOPIC) 0.1 % external ointment APPLY EXTERNALLY BID       triamcinolone (KENALOG) 0.1 % external cream        triamcinolone (KENALOG) 0.1 % external ointment SEBLE EXT TO ARMS BID FOR 14 DAYS       valACYclovir (VALTREX) 500 MG tablet Take 500 mg by mouth                       Allergy Tests:    Past Allergy Test    Future Allergy Test: 12/8/2020     Order for PATCH TESTS    [x] Outpatient  [] Inpatient: James..../ Bed ....      Skin Atopy (atopic dermatitis) [] Yes   [x] No  Contact allergies:   [] Yes   [x] No  Urticaria/Angioedema  [] Yes   [x] No  Rhinitis/Sinusitis:   [] Yes   [x] No   [] seasonal [] perennial    [] plants?   [] grasses?   [] other?  Allergic Asthma:   [] Yes   [x] No  Pets :                 [x] Yes   [] No - dog  Food Allergy:   [] Yes   [x] No  Drug allergies:               [] Yes   [] No - maybe  Leg ulcers:   [] Yes   [x] No  Hand eczema:   [] Yes   [x] No   Leading hand:     [x] R       [] L       [] Ambidextrous           Reason for tests (suspected allergy): diagnosis of facial erythema (airborne vs photo)  Known previous allergies: no anaphylaxis allergens  Standardized panels  [x] Standard panel (40 tests)  [] Preservatives & Antimicrobials (31 tests)  [] Emulsifiers & Additives (25 tests)   [x] Perfumes/Flavours & Plants (25 tests)  [] Hairdresser panel (12 tests)  [] Rubber Chemicals (22 tests)  [] Plastics (26 tests)  [] Colorants/Dyes/Food additives (20 tests)  [] Metals (implants/dental) (24 tests)  [x] Local anaesthetics/NSAIDs (13 tests) ==> NSAIDs only and do in double with and without UVA exposure  [] Antibiotics & Antimycotics (14  tests)   [] Corticosteroids (15 tests)   [x] Photopatch test (62 tests)   [x] others:  Seroquel, Gabapentin, Amitryptilin, Progesteron with and without UV exposure      [x] Patient's own products: eucrisa, elidel, ibuprofen/acetaminophen without exposure to UVA    DO NOT test if chemical or biological identity is unknown!     always ask from patient the product information and safety sheets (MSDS)   [x] Atopy screen prick test (Atopic predisposition): ...    Standardized prick panels  [x] Atopic panel (20 tests)  [] Pediatric Panel (12 tests)  [] Milk, Meat, Eggs, Grains (20 tests)   [] Dust, Epithelia, Feathers (10 tests)  [] Fish, Seafood, Shellfish (17 tests)  [] Nuts, Beans (14 tests)  [] Spice, Vegetable, Fruit (17 tests)  [] Others: ...      [] Patient's own products: ...    DO NOT test if chemical or biological identity is unknown!     always ask from patient the product information and safety sheets (MSDS)     Standardized intradermal tests  [x] Penicillium notatum [x] Aspergillus fumigatus [x] House dust mites  [] Bee venom   [] Wasp venom !!Specific protocol with dilutions!!  [] Others: ...    [] Patient needs consultation with Allergy team (changes of tests may apply)  [] Tests discussed with Allergy team (can have direct appointment for allergy tests)     RESULTS & EVALUATION of PATCH TESTS    Patch test readings after     [x] 2 days, [] 3 days [x] 4 days, [] 5 days,    Applied patch tests with results (import here the list of patch tests):    STANDARD Series      No Substance 2 days 4 days remarks   1 Ganga Mix [C] - -    2 Colophony - -    3  2-Mercaptobenzothiazole  - -     4 Methylisothiazolinone - -    5 Carba Mix - -    6 Thiuram Mix [A] - -    7 Bisphenol A Epoxy Resin - -    8 N-Tmlk-Xfxnnipfnny-Formaldehyde Resin - -    9 Mercapto Mix [A] - -    10 Black Rubber Mix- PPD [B] - -    11 Potassium Dichromate  -  -    12 Balsam of [place] (Myroxylon Pereirae Resin) - -    13 Nickel Sulphate Hexahydrate  - -    14 Mixed Dialkyl Thiourea - -    15 Paraben Mix [B] - -    16 Methyldibromo Glutaronitrile - -    17 Fragrance Mix - -    18 2-Bromo-2-Nitropropane-1,3-Diol (Bronopol) - -    19 Lyral - -    20 Tixocortol-21- Pivalate - -    21 Diazolidiyl Urea (Germall II) - -    22 Methyl Methacrylate - -    23 Cobalt (II) Chloride Hexahydrate - -    24 Fragrance Mix II  - -    25 Compositae Mix - -    26 Benzoyl Peroxide - -    27 Bacitracin - -    28 Formaldehyde - -    29 Methylchloroisothiazolinone / Methylisothiazolinone - -    30 Corticosteroid Mix - -    31 Sodium Lauryl Sulfate - -    32 Lanolin Alcohol - -    33 Turpentine - -    34 Cetylstearylalcohol - -    35 Chlorhexidine Dicluconate - -    36 Budenoside - -    37 Imidazolidinyl Urea  - -    38 Ethyl-2 Cyanoacrylate - -    39 Quaternium 15 (Dowicil 200) - -    40 Decyl Glucoside - -      PERFUMES, FLAVORS & PLANTS      No Substance 2 days 4 days remarks   41 1 Benzyl Cinnamate - -    42 2 Di-Limonene (Dipentene) - -    43 3 Cananga Odorata (Alphonse Cunha) (I) - -    44 4 Lichen Acid Mix - -    45 5 Mentha Piperita Oil (Peppermint Oil) - -    46 6 Sesquiterpenelactone mix - -    47 7 Tea Tree Oil, Oxidized - -    48 8 Wood Tar Mix - -    49 9 Abietic Acid - -    50 10 Lavendula Angustifolia Oil (Lavender Oil) - -    51 11 Camphor  - -      Fragrance Mix I      52 12 [place] Absolute - -    53 13 Eugenol - -    54 14 Geraniol - -    55 15 Hydroxycitronellal - -    56 16 Isoeugenol - -    57 17 Cinnamic Aldehyde - -    58 18 Cinnamic Alcohol  - -      Fragrance mix II      59 19 Citronellol - -    60 20 Alpha-Hexylcinnamic Aldehyde    - -    61 21 Citral - -    62 22 Farnesol - -    63 23 Coumarin - -      LOCAL ANESTHETICS / NSAIDs    No Substance 2 days 4 days remarks     NSAIDs      64 8 Diclofenac - -    65 9 Ibuprofen - -    66 10 Ketoprofen - -    67 11 Paracetamol - -    68 12 Acetylsalicylic acid - -    69 13 Peroxicam      70 14 Naproxen - -      PHOTOPATCH  tests (unexposed)      No Substance 2 days 4 days remarks   71 1 3,4,4'-Triclocarban - -    72 2 Coumarin - -    73 3 Bithiniol - -    74 4 Usnic Acid - -    75 5 Compositae Mix - -    76 6 Wood Tar Mix - -    77 7 Balsam of [place] (Myroxylon Pereirae Resin) - -    78 8 Hexachlorophene - -    79 9 Chlorhexidine Digluconate - -    80 10 Triclosan - -    81 11 Fragrance Mix - -    82 12 Ketoprofen  - -    83 13 Lichen Acid Mix - -    84 14 Musk Ambrette - -      Sunscreens (UV filters)  -    85 15 P-Aminobenzoic Acid - -    86 16 2-Hydroxy-4-Methoxy Benzophenone (Oxybenzone) - -    87 17 Tert-Butyl-Methoxydibenzoyl Methane  - -    88 18 3-4-Mehyl Benzyliden Camphor  - -    89 19 2-Ethylhexyl-P (Dimethylamino) Benzoate - -    90 20 7-Mitwybnf-3-Methoxy-4-Methyl Benzophenone - -    91 21 Benzyl Salicylate - -    92 22 Isoamyl-4-Methoxycinnamate - -    93 23 Phenyl Benzimidazole - 5 - Sulfonic Acid - -    94 24 1-Lhoknqsrdhyvm-2-Hydroxybenzoyl-Benzoic Acid Hexyl Melinda - -    95 25 Menthyl Anthranilate (Meradimate) - -    96 26 Bis-Ethylhexyloxyphenol-Methoxy Phenyl Triazine - -    97 27 Diethylhexyl Butamido Triazone - -    98 28 Disodium Phenyldibenzimidazole Tetrasulfonate - -    99 29 Octocrylene - -    100 30 Octyl Triazone - -    101 31 Tinsorb (Methylene - Bis - Benzotriazyl Tetramethylbutylphenol) - -        Patch Tests   as is  as is 1:2 paraffin 1:2 paraffin       Substance Conc  days  days  days  days remarks   102 103 Seroquel         104 105 Gabapentin         106 107 Amitryptilin         108 109 Progesterone                      OTHER PRODUCTS      No Substance Conc  % solv 2 days 4 days remarks   110 1           PHOTOPATCH tests (exposed)      No Substance 2 days 4 days remarks   111 1 3,4,4'-Triclocarban - -    112 2 Coumarin - -    113 3 Bithiniol - -    114 4 Usnic Acid - -    115 5 Compositae Mix - -    116 6 Wood Tar Mix - -    117 7 Balsam  [place] (Myroxylon Pereirae Resin) - -    118 8 Hexachlorophene  - -    119 9 Chlorhexidine Digluconate - -    120 10 Triclosan - -    121 11 Fragrance Mix - -    122 12 Ketoprofen  - -    123 13 Lichen Acid Mix - -    124 14 Musk Ambrette - -      Sunscreens (UV filters)  -    125 15 P-Aminobenzoic Acid - -    126 16 2-Hydroxy-4-Methoxy Benzophenone (Oxybenzone) - -    127 17 Tert-Butyl-Methoxydibenzoyl Methane  - -    128 18 3-4-Mehyl Benzyliden Camphor  - -    129 19 2-Ethylhexyl-P (Dimethylamino) Benzoate - -    130 20 6-Lycxhfhk-0-Methoxy-4-Methyl Benzophenone - -    131 21 Benzyl Salicylate - -    132 22 Isoamyl-4-Methoxycinnamate - -    133 23 Phenyl Benzimidazole - 5 - Sulfonic Acid - -    134 24 3-Rlfvlpxhjrrml-1-Hydroxybenzoyl-Benzoic Acid Hexyl Melinda - -    135 25 Menthyl Anthranilate (Meradimate) - -    136 26 Bis-Ethylhexyloxyphenol-Methoxy Phenyl Triazine - -    137 27 Diethylhexyl Butamido Triazone - -    138 28 Disodium Phenyldibenzimidazole Tetrasulfonate - -    139 29 Octocrylene - -    140 30 Octyl Triazone - -    141 31 Tinsorb (Methylene - Bis - Benzotriazyl Tetramethylbutylphenol) - -    Procedure: Apply the same photopatch series 2  times on the back. Remove one patch side for irradiation after 1 day and then irradiate this site with 10 J/cm2 UVA. Remove the other, non-irradiated patch sites on day 2.   Maybe perform on these patients as well standardized UV-B and UV-A MED tests.      Patch Tests   as is  as is 1:2 paraffin 1:2 paraffin       Substance Conc  days  days  days  days remarks   142 143 Seroquel         144 145 Gabapentin         146 147 Amitryptilin         148 149 Progesterone                      OTHER PRODUCTS      No Substance Conc  % solv 2 days 4 days remarks   150 1           Results of patch tests:                         Interpretation:    - Negative                    A    = Allergic      (+) Erythema    TI   = Toxic/irritant   + E + Infiltration    RaP = Relevance at Present     ++ E/I + Papulovesicle   Rpr  = Relevance  Previously     +++ E/I/P + Blister     nR   = No Relevance      Atopy Screen 1/11/2021    No Substance Readings (15 min) Evaluation   POS Histamine 1mg/ml -    NEG NaCl 0.9% -      No Substance Readings (15 min) Evaluation   1 Alternaria alternata (tenuis)  -    2 Cladosporium herbarum -    3 Aspergillus fumigatus -    4 Penicillium notatum -    5 Dermatophagoides pteronyssinus -    6 Dermatophagoides farinae -    7 Dog epithelium (canis spp) -    8 Cat hair (bridget catus) -    9 Cockroach   (Blatella americana & germanica) -    10 Grass mix midwest   ([place], Orchard, Redtop, Joe) -    11 Raul grass (sorghum halepense) -    12 Weed mix   (common Cocklebur, Lamb s quarters, rough redroot Pigweed, Dock/Sorrel) -    13 Mug wort (artemisia vulgare) -    14 Ragweed giant/short (ambrosia spp) -    15 English Plantain (plantago lanceolata) -    16 Tree mix 1 (Pecan, Maple BHR, Oak RVW, american [place], black [place]) -    17 Red cedar (juniperus virginia) -    18 Tree mix 2   (white Giorgi, river/red Birch, black [place], common [place], american Elm) -    19 Box elder/Maple mix (acer spp) -    20 Jennings shagbark (carya ovata) -       -      Conclusion:      Intradermal Testing (Placed 01/11/21 )    No Substance Conc.  Readings (15min) Evaluation   - NaCl  0.9% -    + Histamine (prick) 0.1mg / ml -    DF Standard Dust Mite - D. Farinae 1:10 -    DP Standard Dust Mite - D. Pteronyssinus 1:10 -    A Aspergillus fumigatus  1:10 -    P Penicillium notatum 1:10 -      Conclusion:       PATIENTS OWN      DRUGS/SUBSTANCES      Prick Tests         Substance/ Allergen Concentration Result (15min) Remarks   1 Seroquel      2 Gabapentin      3 Amitryptilin      4 Progesterone               [] No relevant allergic reaction observed    [] Allergic reaction diagnosed against following allergens:      Interpretation/ remarks:   See later    [] Patient information given   [] ACDS CAMP information's (# ....) to following compounds:  .....   [] General information's to following compounds: ......    ==> final Diagnosis:    1. 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 Possible atopic predisposition  Based off H&P, presenting condition does appear consistent with an allergic contact dermatitis (photo vs airborne). Will plan for patch testing for these conditions with atopic prick/intradermal screening. Will also test patient's medication  Plan   - Will plan for testing (see above)    - Recommended to also bring home medications   - last oral steroids in November 2020   - Alibour no improvement   - no improvement on topical mometasone and Elidel   - Dupixent since June = some stabilization, but no major improvement     - Photodocumentation obtained in clinic   - Prescribed celecoxib 200mg BID PRN for low back pain    These conclusions are made at the best of one's knowledge and belief based on the provided evidence such as patient's history and allergy test results and they can change over time or can be incomplete because of missing information's.    ==> Treatment prescribed/Plan:  See later      Follow-up: in Derm-Allergy clinic for 1st readings of patch tests after 2 days (virtual) and 2nd readings and final conclusions after 4 days (in person)      Thank you for the opportunity be involved in the care of this patient.     Staff: *** Aditi Mukherjee LPN & Marialuisa Liu and Bertha Amin RN      I spent a total of *** minutes face to face with Marialuisa Gongora during today s office visit. Over 50% of this time was spent discussing all the individual test results, correlating them to the clinical relevance, counseling the patient and/or coordinating care. Please see Assessment and Plan for details.  This excludes any time spent performing ____________ (ex:  bx, applying patch tests, cryo therapy)

## 2021-01-11 ENCOUNTER — OFFICE VISIT (OUTPATIENT)
Dept: ALLERGY | Facility: CLINIC | Age: 61
End: 2021-01-11
Payer: COMMERCIAL

## 2021-01-11 DIAGNOSIS — Z88.9 DRUG ALLERGY: ICD-10-CM

## 2021-01-11 DIAGNOSIS — L56.8 PHOTODERMATITIS: ICD-10-CM

## 2021-01-11 DIAGNOSIS — L20.9 ATOPIC DERMATITIS, UNSPECIFIED TYPE: ICD-10-CM

## 2021-01-11 DIAGNOSIS — L25.9 CONTACT DERMATITIS, UNSPECIFIED CONTACT DERMATITIS TYPE, UNSPECIFIED TRIGGER: Primary | ICD-10-CM

## 2021-01-11 PROCEDURE — 95044 PATCH/APPLICATION TESTS: CPT | Performed by: DERMATOLOGY

## 2021-01-11 PROCEDURE — 96912 PHOTOCHEMOTHERAPY PUVA: CPT | Performed by: DERMATOLOGY

## 2021-01-11 PROCEDURE — 95024 IQ TESTS W/ALLERGENIC XTRCS: CPT | Performed by: DERMATOLOGY

## 2021-01-11 PROCEDURE — 95044 PATCH/APPLICATION TESTS: CPT | Mod: 59 | Performed by: DERMATOLOGY

## 2021-01-11 PROCEDURE — 95052 PHOTO PATCH TESTS: CPT | Performed by: DERMATOLOGY

## 2021-01-11 PROCEDURE — 95004 PERQ TESTS W/ALRGNC XTRCS: CPT | Performed by: DERMATOLOGY

## 2021-01-11 NOTE — PATIENT INSTRUCTIONS
Patch Testing Information  What are allergen patch tests?    The test is done to look for skin allergies that may be causing rashes and irritation.    A patch test is a way of identifying whether a substance has caused a delayed reaction with skin inflammation, such as contact eczema or delayed (after days) reactions to drugs.     We will use various types of test compounds, which may include common allergens you may come in contact with in daily life such as preservatives, fragrances or even drugs.     Most of the time we will use standardized, prefabricated test solutions. The choice of the substances and series tested will depend on your history of reactions. Sometimes we will test your own products as well.     In order to avoid severe toxic reactions we need detailed information or safety sheets for each of the test compounds.    The test panels are set up with a small amount of common substances that cause skin allergies. They are taped to your skin with a clear hypoallergenic bandage and reinforced hypoallergenic tape.    The substances are numbered, so it is easy to tell what is causing a skin reaction.  What do I need to do in preparation for the test?    Stop all systemic steroids 1 month prior to the testing.     Stop applying topical steroids to the test area one week prior to the test. It is to use them elsewhere throughout the testing process. If this is not possible then discuss options with the Allergy specialist.    Do not go sunbathing or tanning for one week prior to testing    It is okay to take antihistamines as normal.     Please wear dark colors the week of the test since we will write on you with a dark marker that may transfer and stain clothing or bedding.     Some medications can affect the reactions to allergens during the tests. Therefore reveal all the medications you take to the allergy team. The doctor will discuss the medications with you before the tests.     Eat how you normally  would.    Avoid the following:    You cannot get the test area wet during the entire test period. This means no bathing or swimming the entire test period.    No strenuous exercise that may cause sweating.    Do not scratch the test area, this can cause the allergen to spread and give us false positives.     Avoid exposure to UV irradiation. This means no tanning or UV treatment during the testing period.   What can I expect?    Patch testing is done over three appointments.   o The usual schedule is: Monday (Allergen patches are placed), Wednesday (Patches are removed and skin is examined by the MD), and Friday (Skin is examined by the MD)      If you are allergic, there will be an area of irritation where the test was placed.    Itching or burning at the test site might happen if you are allergic to the allergen.  Do not rub or scratch the test site since this may spread the allergen and possibly cause false positives. If itching or burning is not tolerable please contact the clinic.    The marker we draw on your back with ma take up to 5 weeks to wash off completely. Rubbing alcohol can help speed up this process.     Reactions can occur 1 to 2 weeks after the tests are applied. If this happens please take photos of the area and contact the clinic.  What should I do after the tests are placed?    Keep the area dry. No showering or getting the test area wet from the time we see you at your first visit until after your third appointment. If you get the test area wet you are washing off the test and we could get false negative reactions.    If you notice any of the test patches coming loose put on more tape to re-secure the test.    If the marker that is applied fades you can use a dark pen to es around the panel sites.    Cover the test area when you are outside to avoid any sun exposure while the test is in place.     You can remove the tests only if there is a severe reaction (itch, pain, blistering). Please  report this to your doctor immediately. If you have to remove the tests please es the locations of each test field with a grid so we can identify the allergen.  WHAT ARE THE POSSIBLE RISKS OF PATCH TESTS     If you are allergic to a compound tested you will develop a localized skin reaction similar to your previous reaction, this may take days to develop. These reactions include a formation of red, itchy skin lesions that could be about a centimeter with small vesicles or possibly even blisters. The lesions will fade over time, this may take weeks. The test might leave some skin pigmentation for a few months.     In rare cases a localized reaction to patch testing can become generalized.     The tests with your own products might have some risks because they are not standardized and unanticipated reactions could occur. We need as much information as possible to evaluate if your own products are safe to test and under what conditions. This has to be evaluated for each individual product.   Useful Contact Information    To contact your doctor you can either send a [employer] message or call [phone]    Address  o [address] [place] [address], [address]    If you develop any serious or adverse allergic reaction after office hours please seek immediate medical assistance at the nearest clinic, urgent care, or emergency room.  Removal of Patch Tests on Day 3:    Remove patches and tape from one test area (one rectangle)          Using the purple surgical markers provided (or other permanent marker), draw a grid around the test area so that the circular indentation is in the center of each square, as below. Try to be as neat as possible and keep the lines as straight as you can (you can use a ruler if you need to)          Redraw the number that was underneath the tape above the grids, as shown below. Try to print clearly.          Repeat the process for the remaining test areas.          Photograph the  entire area then take close-up photos of any possible reactions. Examples of possible reactions are spots that look like these:          Return to clinic for evaluation as instructed. You should continue to avoid getting the area wet (no showering or strenuous exercise), exposing the area to UV light, using topical steroids, or scratching.         Who should I call with questions?  [facility], [place]: [phone]  For urgent needs outside of business hours call the [facility] at [phone] and ask for the dermatology resident on call      If you develop any serious or adverse allergic reaction after office hours please seek immediate medical assistance at the nearest clinic or emergency room

## 2021-01-11 NOTE — NURSING NOTE
Chief Complaint   Patient presents with     Allergy Recheck     Jose Mperfecto is here for patch testing day 1      Bertha Iyer RN

## 2021-01-13 ENCOUNTER — ALLIED HEALTH/NURSE VISIT (OUTPATIENT)
Dept: DERMATOLOGY | Facility: CLINIC | Age: 61
End: 2021-01-13
Payer: COMMERCIAL

## 2021-01-13 ENCOUNTER — OFFICE VISIT (OUTPATIENT)
Dept: ALLERGY | Facility: CLINIC | Age: 61
End: 2021-01-13
Payer: COMMERCIAL

## 2021-01-13 DIAGNOSIS — Z01.82 ENCOUNTER FOR ALLERGY TESTING: Primary | ICD-10-CM

## 2021-01-13 DIAGNOSIS — L25.9 CONTACT DERMATITIS, UNSPECIFIED CONTACT DERMATITIS TYPE, UNSPECIFIED TRIGGER: Primary | ICD-10-CM

## 2021-01-13 DIAGNOSIS — Z88.9 DRUG ALLERGY: ICD-10-CM

## 2021-01-13 DIAGNOSIS — L20.9 ATOPIC DERMATITIS, UNSPECIFIED TYPE: ICD-10-CM

## 2021-01-13 DIAGNOSIS — L56.8 PHOTODERMATITIS: ICD-10-CM

## 2021-01-13 PROCEDURE — 99207 PR NO CHARGE LOS: CPT

## 2021-01-13 PROCEDURE — 99207 PR NO CHARGE LOS: CPT | Performed by: DERMATOLOGY

## 2021-01-13 ASSESSMENT — PAIN SCALES - GENERAL: PAINLEVEL: NO PAIN (0)

## 2021-01-13 NOTE — NURSING NOTE
Allergy Rooming Note    Marialuisa Gongora's goals for this visit include:   Chief Complaint   Patient presents with     Allergy Testing Followup     Marialuisa is here for patch testing day 3     Aditi Mukherjee LPN

## 2021-01-13 NOTE — PROGRESS NOTES
[facility] Phototherapy Record  1. Marialuisa Gongora is a 60 year old female is here today for phototherapy (UVA-1) treatment for diagnostic photopatch allergy testing.        Changes or new medications since last treatment:   NO    New medical conditions or problems since last treatment:   NO    Any problems with last phototherapy treatment?    NO    2. The patient tolerated phototherapy without complication    All questions and concerns discussed with patient in clinic today.      Bertha Iyer RN    Marialuisa Gongora comes into clinic today at the request of  Ordering Provider for photopatch allergy testing  This service provided today was under the supervising provider of the day , who was available if needed.    Bertha Iyer RN

## 2021-01-15 ENCOUNTER — OFFICE VISIT (OUTPATIENT)
Dept: ALLERGY | Facility: CLINIC | Age: 61
End: 2021-01-15
Payer: COMMERCIAL

## 2021-01-15 DIAGNOSIS — L53.9 FACIAL ERYTHEMA: ICD-10-CM

## 2021-01-15 DIAGNOSIS — L20.89 OTHER ATOPIC DERMATITIS: Primary | ICD-10-CM

## 2021-01-15 PROCEDURE — 99214 OFFICE O/P EST MOD 30 MIN: CPT | Performed by: DERMATOLOGY

## 2021-01-15 RX ORDER — DOXEPIN HYDROCHLORIDE 10 MG/1
10 CAPSULE ORAL AT BEDTIME
Qty: 60 CAPSULE | Refills: 3 | Status: SHIPPED | OUTPATIENT
Start: 2021-01-15 | End: 2024-06-10

## 2021-01-15 ASSESSMENT — PAIN SCALES - GENERAL: PAINLEVEL: NO PAIN (0)

## 2021-01-15 NOTE — PROGRESS NOTES
"Note for return visit for Dermato-Allergy       Encounter Date: Clayton 15, 2021    History of Present Illness:  Marialuisa Gongora is a 60 year old female who presents in person to the consult following information has been take directly from the prior notes, patients informations, Epic and/or other sources and exam/history performed by myself:     Patient here for 2nd readings and final conclusions after 4 days (in person)    Additional comments and observations from review of the patient s chart including the following:    See notes for more details    ROS: Patient generally feeling well today   Physical Examination:  General: Well-appearing, appropriately-developed individual.  - Skin: Focused examination of the skin on test sites within the consultation was performed.   As above in HPI. No additional skin concerns.  See test results below  Evaluation of the skin on face with blanchable erythema over cheeks and forehead. No desquamation, but impressive Erythema  - upper respiratory tract: currently no obvious Rhinitis  - lungs: no signs for active and present Asthma/Wheezing or coughing  - eyes: currently no active conjunctivits  - GI system/digestion: currently no problems, no bloating or Diarrhoea      Earlier History and Allergy exams:    Reports that she first noticed the rash back in April 2019. Was on her face and anterior neck. Would come and go. Unclear trigger with intermittent response to topicals. Had a biopsy from the neck which revealed \"9/3/19: spongiotic dermatitis with occasional eosinophils\". Underwent extensive patch testing by Dr. Gamez in December 2019 which revealed no strong or mild reactions and doubtful reactions to cetrimonium chloride, methenamine (formaldehyde), formaldehyde 1% (suspected irritants). Since patch testing, has had fluctuation of symptoms. Had some improvement with prednisone, but required higher doses to maintain. Had initial improvement with dupixent, but now losing efficacy. " "Notes that back in February, she was in [place] for two weeks and no rash then, but [place] the [place] due to [place] concerns, she had a recurrence of the rash    Currently, described the rash as red, hot, burning, and associated with pain. Only using water and vaseline. Worsened with eucrisa. Denies any other areas affected by rash. Denies any muscle weakness or difficulty walking up/down stairs. Denies any dysphagia. No new medications    Previous patch testing with Dr. Gamez \"patch testing with 287 allergens, including the NACDG standard series, in addition to our corticosteroid, personal care product (general, shampoo/soaps), preservatives, emulsifiers, acrylates, hairdressing series, perfumes, sunscreen panels, as well as select other allergens and personal products.\" [place] codes: 504FR1OOI and [id number]    Past Medical History:   Patient Active Problem List   Diagnosis     Controlled substance agreement signed     Spinal stenosis of lumbar region     S/P lumbar spinal fusion     History of anorexia nervosa     Elevated BP without diagnosis of hypertension     Past Medical History:   Diagnosis Date     Displacement of cervical intervertebral disc      Elevated BP without diagnosis of hypertension      Herpes labialis 04/01/2016     History of anorexia nervosa      History of bilateral mastectomy      Infiltrating ductal carcinoma of left breast (H) 2012     Lyme disease     2012     Pneumothorax, right 12/11/2016       Allergy History:     Allergies   Allergen Reactions     Codeine Itching     Baclofen Other (See Comments)     Minocycline      Other Environmental Allergy      Patch Test Results 12-10-19    Very Strong/Strong  None    Mild  None    Borderline (poessible irritation, may not be allergic)  Centrimonium chloride  Methenamine (formaldehyde)  Formaldehyde     Penicillins Hives     5-23 had hives as a childhood rxn - HAS HAD CSN in the past without problem  THROAT SWELLS AND HIVES       Sulfa Drugs " "Unknown       Social History:  The patient works. Patient has the following hobbies or non-occupational exposure: has gone to [facility]     reports that she has never smoked. She has never used smokeless tobacco.      Family History:  No family history on file.    Medications:  Current Outpatient Medications   Medication Sig Dispense Refill     acyclovir (ZOVIRAX) 400 MG tablet Take 400 mg by mouth       amitriptyline (ELAVIL) 10 MG tablet For allergy testing 1 tablet 0     amitriptyline (ELAVIL) 100 MG tablet        Calcium-Magnesium-Vitamin D 400-166.7-133.3 MG-MG-UNIT TABS        celecoxib (CELEBREX) 200 MG capsule Take 1 capsule (200 mg) by mouth 2 times daily 60 capsule 1     COMPOUNDED NON-CONTROLLED SUBSTANCE (CMPD RX) - PHARMACY TO MIX COMPOUNDED MEDICATION [facility] use \"COPPER SULFATE 1%, ZINC SULFATE 1.5% AND CAMPHOR ANTISEPTIC(FCP-8700): dilute stock solution 1:10 in water, keep diluted solution in fridge and use 1-2x daily for compresses 500 mL 3     Desoximetasone (TOPICORT LP) 0.05 % OINT Externally apply topically 2 times daily as needed 60 g 3     Dupilumab (DUPIXENT) 300 MG/2ML syringe Inject 2 mLs (300 mg) Subcutaneous every 14 days for 10 doses 4 mL 2     Dupilumab (DUPIXENT) 300 MG/2ML syringe Inject 2 mLs (300 mg) Subcutaneous every 10 days for 8 doses 4 mL 2     Dupilumab (DUPIXENT) 300 MG/2ML syringe Inject 2 mLs (300 mg) Subcutaneous every 14 days 4 mL 2     DUPIXENT 300 MG/2ML syringe        EUCRISA 2 % ointment SEBLE EXT AA BID       fluocinolone (SYNALAR) 0.025 % ointment SEBLE AA BID       gabapentin (NEURONTIN) 100 MG capsule For allergy testing 1 capsule 0     gabapentin (NEURONTIN) 300 MG capsule TK ONE C PO  BID AND 2 CS PO ONCE D FOR TOTAL DOSE OF 4 CS DAILY       HYDROcodone-acetaminophen (NORCO) 5-325 MG tablet Take 1 tablet by mouth       mometasone (ELOCON) 0.1 % external cream Apply topically daily 45 g 1     pimecrolimus (ELIDEL) 1 % external cream Apply topically 2 times daily " 60 g 3     progesterone (PROMETRIUM) 200 MG capsule For allergy testing 1 capsule 0     progesterone (PROMETRIUM) 200 MG capsule        QUEtiapine (SEROQUEL) 100 MG tablet TK SS TO ONE T PO D       QUEtiapine (SEROQUEL) 200 MG tablet For allergy testing 1 tablet 0     tacrolimus (PROTOPIC) 0.1 % external ointment APPLY EXTERNALLY BID       triamcinolone (KENALOG) 0.1 % external cream        triamcinolone (KENALOG) 0.1 % external ointment SEBLE EXT TO ARMS BID FOR 14 DAYS       valACYclovir (VALTREX) 500 MG tablet Take 500 mg by mouth                       Allergy Tests:    Past Allergy Test    Future Allergy Test: 12/8/2020     Order for PATCH TESTS    [x] Outpatient  [] Inpatient: James..../ Bed ....      Skin Atopy (atopic dermatitis) [] Yes   [x] No  Contact allergies:   [] Yes   [x] No  Urticaria/Angioedema  [] Yes   [x] No  Rhinitis/Sinusitis:   [] Yes   [x] No   [] seasonal [] perennial    [] plants?   [] grasses?   [] other?  Allergic Asthma:   [] Yes   [x] No  Pets :                 [x] Yes   [] No - dog  Food Allergy:   [] Yes   [x] No  Drug allergies:               [] Yes   [] No - maybe  Leg ulcers:   [] Yes   [x] No  Hand eczema:   [] Yes   [x] No   Leading hand:     [x] R       [] L       [] Ambidextrous           Reason for tests (suspected allergy): diagnosis of facial erythema (airborne vs photo)  Known previous allergies: no anaphylaxis allergens  Standardized panels  [x] Standard panel (40 tests)  [] Preservatives & Antimicrobials (31 tests)  [] Emulsifiers & Additives (25 tests)   [x] Perfumes/Flavours & Plants (25 tests)  [] Hairdresser panel (12 tests)  [] Rubber Chemicals (22 tests)  [] Plastics (26 tests)  [] Colorants/Dyes/Food additives (20 tests)  [] Metals (implants/dental) (24 tests)  [x] Local anaesthetics/NSAIDs (13 tests) ==> NSAIDs only and do in double with and without UVA exposure  [] Antibiotics & Antimycotics (14 tests)   [] Corticosteroids (15 tests)   [x] Photopatch test (62 tests)    [x] others:  Seroquel, Gabapentin, Amitryptilin, Progesteron with and without UV exposure      [x] Patient's own products: eucrisa, elidel, ibuprofen/acetaminophen without exposure to UVA    DO NOT test if chemical or biological identity is unknown!     always ask from patient the product information and safety sheets (MSDS)   [x] Atopy screen prick test (Atopic predisposition): ...    Standardized prick panels  [x] Atopic panel (20 tests)  [] Pediatric Panel (12 tests)  [] Milk, Meat, Eggs, Grains (20 tests)   [] Dust, Epithelia, Feathers (10 tests)  [] Fish, Seafood, Shellfish (17 tests)  [] Nuts, Beans (14 tests)  [] Spice, Vegetable, Fruit (17 tests)  [] Others: ...      [] Patient's own products: ...    DO NOT test if chemical or biological identity is unknown!     always ask from patient the product information and safety sheets (MSDS)     Standardized intradermal tests  [x] Penicillium notatum [x] Aspergillus fumigatus [x] House dust mites  [] Bee venom   [] Wasp venom !!Specific protocol with dilutions!!  [] Others: ...    [] Patient needs consultation with Allergy team (changes of tests may apply)  [] Tests discussed with Allergy team (can have direct appointment for allergy tests)     RESULTS & EVALUATION of PATCH TESTS    Patch test readings after     [x] 2 days, [] 3 days [x] 4 days, [] 5 days,    Applied patch tests with results (import here the list of patch tests):    STANDARD Series      No Substance 2 days 4 days remarks   1 Ganga Mix [C] - -    2 Colophony - -    3  2-Mercaptobenzothiazole  - -     4 Methylisothiazolinone - -    5 Carba Mix - -    6 Thiuram Mix [A] - -    7 Bisphenol A Epoxy Resin - -    8 E-Pnsp-Gpfdlatuypb-Formaldehyde Resin - -    9 Mercapto Mix [A] - -    10 Black Rubber Mix- PPD [B] - -    11 Potassium Dichromate  -  -    12 Balsam of [place] (Myroxylon Pereirae Resin) - -    13 Nickel Sulphate Hexahydrate - -    14 Mixed Dialkyl Thiourea - -    15 Paraben Mix [B] - -    16  Methyldibromo Glutaronitrile - (+) No Erythema, slightly rough   17 Fragrance Mix - -    18 2-Bromo-2-Nitropropane-1,3-Diol (Bronopol) - -    19 Lyral - -    20 Tixocortol-21- Pivalate - -    21 Diazolidiyl Urea (Germall II) - -    22 Methyl Methacrylate - -    23 Cobalt (II) Chloride Hexahydrate - -    24 Fragrance Mix II  - -    25 Compositae Mix - -    26 Benzoyl Peroxide - -    27 Bacitracin - -    28 Formaldehyde - -    29 Methylchloroisothiazolinone / Methylisothiazolinone - -    30 Corticosteroid Mix - -    31 Sodium Lauryl Sulfate - -    32 Lanolin Alcohol - -    33 Turpentine - -    34 Cetylstearylalcohol - -    35 Chlorhexidine Dicluconate - -    36 Budenoside - -    37 Imidazolidinyl Urea  - -    38 Ethyl-2 Cyanoacrylate - -    39 Quaternium 15 (Dowicil 200) - -    40 Decyl Glucoside - -      PERFUMES, FLAVORS & PLANTS      No Substance 2 days 4 days remarks   41 1 Benzyl Cinnamate - -    42 2 Di-Limonene (Dipentene) - -    43 3 Cananga Odorata (Alphonse Cunha) (I) - -    44 4 Lichen Acid Mix - -    45 5 Mentha Piperita Oil (Peppermint Oil) - -    46 6 Sesquiterpenelactone mix - -    47 7 Tea Tree Oil, Oxidized - -    48 8 Wood Tar Mix (+) -    49 9 Abietic Acid - -    50 10 Lavendula Angustifolia Oil (Lavender Oil) - -    51 11 Camphor  - -      Fragrance Mix I      52 12 [place] Absolute - -    53 13 Eugenol - -    54 14 Geraniol - -    55 15 Hydroxycitronellal - -    56 16 Isoeugenol - -    57 17 Cinnamic Aldehyde - -    58 18 Cinnamic Alcohol  - -      Fragrance mix II      59 19 Citronellol - -    60 20 Alpha-Hexylcinnamic Aldehyde    - -    61 21 Citral - -    62 22 Farnesol - -    63 23 Coumarin - -      LOCAL ANESTHETICS / NSAIDs    No Substance 2 days 4 days remarks     NSAIDs      64 8 Diclofenac - -    65 9 Ibuprofen - -    66 10 Ketoprofen - -    67 11 Paracetamol - -    68 12 Acetylsalicylic acid - -    69 13 Peroxicam      70 14 Naproxen - -      PHOTOPATCH tests (unexposed)      No Substance 2  days 4 days remarks   71 1 3,4,4'-Triclocarban - -    72 2 Coumarin - -    73 3 Bithiniol - -    74 4 Usnic Acid - -    75 5 Compositae Mix - -    76 6 Wood Tar Mix - -    77 7 Balsam of [place] (Myroxylon Pereirae Resin) - -    78 8 Hexachlorophene - -    79 9 Chlorhexidine Digluconate - -    80 10 Triclosan - -    81 11 Fragrance Mix - -    82 12 Ketoprofen  - -    83 13 Lichen Acid Mix - -    84 14 Musk Ambrette - -      Sunscreens (UV filters)  -    85 15 P-Aminobenzoic Acid - -    86 16 2-Hydroxy-4-Methoxy Benzophenone (Oxybenzone) - -    87 17 Tert-Butyl-Methoxydibenzoyl Methane  - -    88 18 3-4-Mehyl Benzyliden Camphor  - -    89 19 2-Ethylhexyl-P (Dimethylamino) Benzoate - -    90 20 1-Eaefycsl-4-Methoxy-4-Methyl Benzophenone - -    91 21 Benzyl Salicylate - -    92 22 Isoamyl-4-Methoxycinnamate - -    93 23 Phenyl Benzimidazole - 5 - Sulfonic Acid - -    94 24 3-Wgkyjtbpxcimu-9-Hydroxybenzoyl-Benzoic Acid Hexyl Melinda - -    95 25 Menthyl Anthranilate (Meradimate) - -    96 26 Bis-Ethylhexyloxyphenol-Methoxy Phenyl Triazine - -    97 27 Diethylhexyl Butamido Triazone - -    98 28 Disodium Phenyldibenzimidazole Tetrasulfonate - -    99 29 Octocrylene - -    100 30 Octyl Triazone - -    101 31 Tinsorb (Methylene - Bis - Benzotriazyl Tetramethylbutylphenol) - -        Patch Tests   as is  as is 1:2 paraffin 1:2 paraffin       Substance Conc  days  days  days  days remarks   102 103 Seroquel         104 105 Gabapentin         106 107 Amitryptilin         108 109 Progesterone                      OTHER PRODUCTS      No Substance Conc  % solv 2 days 4 days remarks   110 1           PHOTOPATCH tests (exposed)      No Substance 2 days 4 days remarks   111 1 3,4,4'-Triclocarban - -    112 2 Coumarin - -    113 3 Bithiniol - -    114 4 Usnic Acid - -    115 5 Compositae Mix (+) -    116 6 Wood Tar Mix - -    117 7 Balsam of [place] (Myroxylon Pereirae Resin) (+) -    118 8 Hexachlorophene - -    119 9 Chlorhexidine  Digluconate - -    120 10 Triclosan - -    121 11 Fragrance Mix (+) -    122 12 Ketoprofen  - -    123 13 Lichen Acid Mix - -    124 14 Musk Ambrette - -      Sunscreens (UV filters)  -    125 15 P-Aminobenzoic Acid - -    126 16 2-Hydroxy-4-Methoxy Benzophenone (Oxybenzone) - -    127 17 Tert-Butyl-Methoxydibenzoyl Methane  - -    128 18 3-4-Mehyl Benzyliden Camphor  - -    129 19 2-Ethylhexyl-P (Dimethylamino) Benzoate - -    130 20 0-Emtqvzkj-6-Methoxy-4-Methyl Benzophenone - -    131 21 Benzyl Salicylate - -    132 22 Isoamyl-4-Methoxycinnamate - -    133 23 Phenyl Benzimidazole - 5 - Sulfonic Acid - -    134 24 0-Plgcfwqnpvdgp-3-Hydroxybenzoyl-Benzoic Acid Hexyl Melinda - -    135 25 Menthyl Anthranilate (Meradimate) - -    136 26 Bis-Ethylhexyloxyphenol-Methoxy Phenyl Triazine - -    137 27 Diethylhexyl Butamido Triazone - -    138 28 Disodium Phenyldibenzimidazole Tetrasulfonate - -    139 29 Octocrylene - -    140 30 Octyl Triazone - -    141 31 Tinsorb (Methylene - Bis - Benzotriazyl Tetramethylbutylphenol) - -    Procedure: Apply the same photopatch series 2  times on the back. Remove one patch side for irradiation after 1 day and then irradiate this site with 10 J/cm2 UVA. Remove the other, non-irradiated patch sites on day 2.   Maybe perform on these patients as well standardized UV-B and UV-A MED tests.      Patch Tests   as is  as is 1:2 paraffin 1:2 paraffin       Substance Conc  2 days  4 days  2 days  4 days remarks   142 143 Seroquel  - - - -    144 145 Gabapentin  - - - -    146 147 Amitryptilin  - - (+) -    148 149 Progesterone  - - - -                   Results of patch tests:                         Interpretation:    - Negative                    A    = Allergic      (+) Erythema    TI   = Toxic/irritant   + E + Infiltration    RaP = Relevance at Present     ++ E/I + Papulovesicle   Rpr  = Relevance Previously     +++ E/I/P + Blister     nR   = No Relevance      Atopy Screen 1/11/2021    No  Substance Readings (15 min) Evaluation   POS Histamine 1mg/ml +    NEG NaCl 0.9% -      No Substance Readings (15 min) Evaluation   1 Alternaria alternata (tenuis)  -    2 Cladosporium herbarum -    3 Aspergillus fumigatus -    4 Penicillium notatum -    5 Dermatophagoides pteronyssinus -    6 Dermatophagoides farinae -    7 Dog epithelium (canis spp) -    8 Cat hair (bridget catus) -    9 Cockroach   (Blatella americana & germanica) -    10 Grass mix midwest   (Deedee, Orchard, Redtop, Joe) -    11 Raul grass (sorghum halepense) -    12 Weed mix   (common Cocklebur, Lamb s quarters, rough redroot Pigweed, Dock/Sorrel) -    13 Mug wort (artemisia vulgare) -    14 Ragweed giant/short (ambrosia spp) -    15 English Plantain (plantago lanceolata) -    16 Tree mix 1 (Pecan, Maple BHR, Oak RVW, american Lagunitas, black [place]) -    17 Red cedar (juniperus virginia) -    18 Tree mix 2   (white Giorgi, river/red Birch, black [place], common Magnolia, american Elm) +    19 Box elder/Maple mix (acer spp) -    20 [place] shagbark (carya ovata) -       -      Conclusion: no clear sign for atopy. Slight reaction to tree pollens, but not sure.      Intradermal Testing (Placed 01/11/21 )    No Substance Conc.  Readings (15min) Evaluation   -          + Histamine (prick) 0.1mg / ml -    DF Standard Dust Mite - D. Farinae 1:10 - 3mmP/no E   DP Standard Dust Mite - D. Pteronyssinus 1:10 (+) 4mmP/E   A Aspergillus fumigatus  1:10 - 3mmP/no E   P Penicillium notatum 1:10 - 3mmP/no E     Conclusion: no immediate reaction. No delayed reactions    [x] No relevant allergic reaction observed: no signs for relevant contact allergy or photosensitization and no signs for atopy    ==> final Diagnosis:    # recurrent erythema with burning on face and decolletage    No signs in patch tests for relevant contact sensitizations    No signs in photopatch tests for relevant photosensitization    No signs in prick and intradermal tests for atopic  dermatitis/atopy   ==> most probably some type of vasovagal, flush-like reaction     - last oral steroids in November 2020   - [facility] no improvement   - no improvement on topical mometasone and Elidel   - Dupixent since June = some stabilization, but no major improvement = no   atopy, I would stop that   - can use NSAIDs as necessary    Because it does not seem allergic or atopic, I would stop Dupixent. I would also not recommend for the moment immunomodulatory treatments.  However, because if might be a flush-like vasovagal reaction, I would like that Dr. Jamison is taking a look and evaluation and treatment options. Maybe laser treatment of vessels and coup nida might be an option.  In the mean time try one Doxepin 10-30mg in the evening (anticholinergic, antihistaminic and effects on peripheral nerves). Start with 10mg and increase every 3 days by 10mg until 30mg.    These conclusions are made at the best of one's knowledge and belief based on the provided evidence such as patient's history and allergy test results and they can change over time or can be incomplete because of missing information's.    ==> Treatment prescribed/Plan:  See later           Follow-up: in Clinic of Dr. Jamison for the flush like erythema and burning in face (non allergic)      Thank you for the opportunity be involved in the care of this patient.     Staff: Aditi Mukherjee LPN and Bertha Amin RN      I spent a total of 34 minutes face to face with Marialuisa Gongora during today s office visit. Over 50% of this time was spent discussing all the individual test results, correlating them to the clinical relevance, counseling the patient and/or coordinating care. Please see Assessment and Plan for details.

## 2021-01-15 NOTE — NURSING NOTE
Allergy Rooming Note    Marialuisa Gongora's goals for this visit include:   Chief Complaint   Patient presents with     Allergy Testing Followup     Marialuisa is here for patch testing day 5     Aditi Mukherjee LPN

## 2021-02-04 ENCOUNTER — TELEPHONE (OUTPATIENT)
Dept: DERMATOLOGY | Facility: CLINIC | Age: 61
End: 2021-02-04

## 2021-02-04 NOTE — TELEPHONE ENCOUNTER
----- Message from Yvrose Jamison MD sent at 1/15/2021 10:56 AM CST -----  Regarding: RE: patient to see with strong facial erythema  Yes, Wanda could you add patient on? Thanks  ----- Message -----  From: Ziyad Stallworth MD  Sent: 1/15/2021   8:58 AM CST  To: Yvrose Jamison MD  Subject: patient to see with strong facial erythema       Dear Yvrose    Could you see this patient with a strong facial erythema and burning sensation before end of the month? I just tested her out with photopatch and atopy screens and there is nothing. I guess it could be a severe coup nida and maybe you have some good ideas how to help this suffering patient. She plans to go to [place] end of the month and it would be great if you could see her before that.    Thanks    Ziyad    ==> final Diagnosis:    # recurrent erythema with burning on face and decolletage  No signs in patch tests for relevant contact sensitizations  No signs in photopatch tests for relevant photosensitization  No signs in prick and intradermal tests for atopic dermatitis/atopy   ==> most probably some type of vasovagal, flush-like reaction     - last oral steroids in November 2020   - [facility] no improvement   - no improvement on topical mometasone and Elidel   - Dupixent since June = some stabilization, but no major improvement = no   atopy, I would stop that   - can use NSAIDs as necessary    Because it does not seem allergic or atopic, I would stop Dupixent. I would also not recommend for the moment immunomodulatory treatments.  However, because if might be a flush-like vasovagal reaction, I would like that Dr. Jamison is taking a look and evaluation and treatment options. Maybe laser treatment of vessels and coup nida might be an option.  In the mean time try one Doxepin 10-30mg in the evening (anticholinergic, antihistaminic and effects on peripheral nerves). Start with 10mg and increase every 3 days by 10mg until 30mg.

## 2021-02-04 NOTE — TELEPHONE ENCOUNTER
I called and left a  asking for Marialuisa to give us a call back to get scheduled with Dr. Jamison for Facial Erythema per Dr. Stallworth.    Wanda Cosby, A

## 2021-03-30 ENCOUNTER — IMMUNIZATION (OUTPATIENT)
Dept: NURSING | Facility: CLINIC | Age: 61
End: 2021-03-30
Payer: COMMERCIAL

## 2021-03-30 PROCEDURE — 0001A PR COVID VAC PFIZER DIL RECON 30 MCG/0.3 ML IM: CPT

## 2021-03-30 PROCEDURE — 91300 PR COVID VAC PFIZER DIL RECON 30 MCG/0.3 ML IM: CPT

## 2021-04-07 DIAGNOSIS — L20.89 OTHER ATOPIC DERMATITIS: ICD-10-CM

## 2021-04-13 NOTE — TELEPHONE ENCOUNTER
[facility] Call Center    Phone Message    May a detailed message be left on voicemail: yes     Reason for Call: Medication Refill Request    Has the patient contacted the pharmacy for the refill? Yes   Name of medication being requested: Dupilumab (DUPIXENT) 300 MG/2ML syringe   Provider who prescribed the medication: Dr. Stallworth  Pharmacy: [facility] - [place], [place] - [address]   [phone]  Date medication is needed: 04/13 Due to ship out today       Action Taken: Message routed to:  [facility] ([facility]): Allergy    Travel Screening: Not Applicable

## 2021-04-15 RX ORDER — DUPILUMAB 300 MG/2ML
INJECTION, SOLUTION SUBCUTANEOUS
Refills: 2 | OUTPATIENT
Start: 2021-04-15

## 2021-04-15 NOTE — TELEPHONE ENCOUNTER
"My name is Dr. Yunier Marin, and I am the dermatology resident on call.    After reviewing the medical chart, this refill request was declined.    \"- Dupixent since June = some stabilization, but no major improvement = no                       atopy, I would stop that              - can use NSAIDs as necessary     Because it does not seem allergic or atopic, I would stop Dupixent. I would also not recommend for the moment immunomodulatory treatments.\"  "

## 2021-04-20 ENCOUNTER — IMMUNIZATION (OUTPATIENT)
Dept: NURSING | Facility: CLINIC | Age: 61
End: 2021-04-20
Attending: INTERNAL MEDICINE
Payer: COMMERCIAL

## 2021-04-20 PROCEDURE — 0002A PR COVID VAC PFIZER DIL RECON 30 MCG/0.3 ML IM: CPT

## 2021-04-20 PROCEDURE — 91300 PR COVID VAC PFIZER DIL RECON 30 MCG/0.3 ML IM: CPT

## 2021-05-15 ENCOUNTER — HEALTH MAINTENANCE LETTER (OUTPATIENT)
Age: 61
End: 2021-05-15

## 2021-05-26 ENCOUNTER — RECORDS - HEALTHEAST (OUTPATIENT)
Dept: ADMINISTRATIVE | Facility: CLINIC | Age: 61
End: 2021-05-26

## 2021-05-27 ENCOUNTER — RECORDS - HEALTHEAST (OUTPATIENT)
Dept: ADMINISTRATIVE | Facility: CLINIC | Age: 61
End: 2021-05-27

## 2021-09-04 ENCOUNTER — HEALTH MAINTENANCE LETTER (OUTPATIENT)
Age: 61
End: 2021-09-04

## 2022-06-11 ENCOUNTER — HEALTH MAINTENANCE LETTER (OUTPATIENT)
Age: 62
End: 2022-06-11

## 2022-10-16 ENCOUNTER — HEALTH MAINTENANCE LETTER (OUTPATIENT)
Age: 62
End: 2022-10-16

## 2023-06-17 ENCOUNTER — HEALTH MAINTENANCE LETTER (OUTPATIENT)
Age: 63
End: 2023-06-17

## 2023-11-20 NOTE — PROGRESS NOTES
"Note for return visit for Dermato-Allergy       Encounter Date: Jan 13, 2021    History of Present Illness:  Marialuisa Gongora is a 60 year old female who presents in person to the consult following information has been take directly from the prior notes, patients informations, Epic and/or other sources and exam/history performed by myself:     Patient here for UV exposure with 10J/cm2 of dedicated sites and 1st readings of patch tests after 2 days (virtual)    Additional comments and observations from review of the patient s chart including the following:    See notes for more details    ROS: Patient generally feeling well today   Physical Examination:  General: Well-appearing, appropriately-developed individual.  - Skin: Focused examination of the test sites on skin within the consultation was performed.   See test results below  As above in HPI. No additional skin concerns.  - upper respiratory tract: currently no obvious Rhinitis  - lungs: no signs for active and present Asthma/Wheezing or coughing  - eyes: currently no active conjunctivits  - GI system/digestion: currently no problems, no bloating or Diarrhoea      Earlier History and Allergy exams:    Reports that she first noticed the rash back in April 2019. Was on her face and anterior neck. Would come and go. Unclear trigger with intermittent response to topicals. Had a biopsy from the neck which revealed \"9/3/19: spongiotic dermatitis with occasional eosinophils\". Underwent extensive patch testing by Dr. Gamez in December 2019 which revealed no strong or mild reactions and doubtful reactions to cetrimonium chloride, methenamine (formaldehyde), formaldehyde 1% (suspected irritants). Since patch testing, has had fluctuation of symptoms. Had some improvement with prednisone, but required higher doses to maintain. Had initial improvement with dupixent, but now losing efficacy. Notes that back in February, she was in [place] for two weeks and no rash then, but " RM:________     PCP: Terra Reddy APRN    : 1948  AGE: 75 y.o.  EST PATIENT     REASON FOR VISIT/  CC:        BP Readings from Last 3 Encounters:   23 140/80   23 128/61   23 118/68      Wt Readings from Last 3 Encounters:   23 71.6 kg (157 lb 12.8 oz)   23 70.8 kg (156 lb)   23 71.7 kg (158 lb)        WT: ____________ BP: __________L __________R HR______    CHEST PAIN: _____________    SOA: _____________PALPS: _______________     LIGHTHEADED: ___________FATIGUE: ________________ EDEMA __________    ALLERGIES:Penicillins, Erythromycin, and Astelin [azelastine] SMOKING HISTORY:  Social History     Tobacco Use    Smoking status: Never     Passive exposure: Never    Smokeless tobacco: Never    Tobacco comments:     CAFFEINE daily    Vaping Use    Vaping Use: Never used   Substance Use Topics    Alcohol use: No    Drug use: No     CAFFEINE USE_________________  ALCOHOL ______________________    "upon return to the [place] due to [place] concerns, she had a recurrence of the rash    Currently, described the rash as red, hot, burning, and associated with pain. Only using water and vaseline. Worsened with eucrisa. Denies any other areas affected by rash. Denies any muscle weakness or difficulty walking up/down stairs. Denies any dysphagia. No new medications    Previous patch testing with Dr. Gamez \"patch testing with 287 allergens, including the NACDG standard series, in addition to our corticosteroid, personal care product (general, shampoo/soaps), preservatives, emulsifiers, acrylates, hairdressing series, perfumes, sunscreen panels, as well as select other allergens and personal products.\" [place] codes: 710ES1JEN and [id number]    Past Medical History:   Patient Active Problem List   Diagnosis     Controlled substance agreement signed     Spinal stenosis of lumbar region     S/P lumbar spinal fusion     History of anorexia nervosa     Elevated BP without diagnosis of hypertension     Past Medical History:   Diagnosis Date     Displacement of cervical intervertebral disc      Elevated BP without diagnosis of hypertension      Herpes labialis 04/01/2016     History of anorexia nervosa      History of bilateral mastectomy      Infiltrating ductal carcinoma of left breast (H) 2012     Lyme disease     2012     Pneumothorax, right 12/11/2016       Allergy History:     Allergies   Allergen Reactions     Codeine Itching     Baclofen Other (See Comments)     Minocycline      Other Environmental Allergy      Patch Test Results 12-10-19    Very Strong/Strong  None    Mild  None    Borderline (poessible irritation, may not be allergic)  Centrimonium chloride  Methenamine (formaldehyde)  Formaldehyde     Penicillins Hives     5-23 had hives as a childhood rxn - HAS HAD CSN in the past without problem  THROAT SWELLS AND HIVES       Sulfa Drugs Unknown       Social History:  The patient works. Patient has the following hobbies or " "non-occupational exposure: has gone to [facility]     reports that she has never smoked. She has never used smokeless tobacco.      Family History:  No family history on file.    Medications:  Current Outpatient Medications   Medication Sig Dispense Refill     acyclovir (ZOVIRAX) 400 MG tablet Take 400 mg by mouth       amitriptyline (ELAVIL) 10 MG tablet For allergy testing 1 tablet 0     amitriptyline (ELAVIL) 100 MG tablet        Calcium-Magnesium-Vitamin D 400-166.7-133.3 MG-MG-UNIT TABS        celecoxib (CELEBREX) 200 MG capsule Take 1 capsule (200 mg) by mouth 2 times daily 60 capsule 1     COMPOUNDED NON-CONTROLLED SUBSTANCE (CMPD RX) - PHARMACY TO MIX COMPOUNDED MEDICATION Aqua d'Alibour use \"COPPER SULFATE 1%, ZINC SULFATE 1.5% AND CAMPHOR ANTISEPTIC(FCP-8700): dilute stock solution 1:10 in water, keep diluted solution in fridge and use 1-2x daily for compresses 500 mL 3     Desoximetasone (TOPICORT LP) 0.05 % OINT Externally apply topically 2 times daily as needed 60 g 3     Dupilumab (DUPIXENT) 300 MG/2ML syringe Inject 2 mLs (300 mg) Subcutaneous every 14 days for 10 doses 4 mL 2     Dupilumab (DUPIXENT) 300 MG/2ML syringe Inject 2 mLs (300 mg) Subcutaneous every 10 days for 8 doses 4 mL 2     Dupilumab (DUPIXENT) 300 MG/2ML syringe Inject 2 mLs (300 mg) Subcutaneous every 14 days 4 mL 2     DUPIXENT 300 MG/2ML syringe        EUCRISA 2 % ointment SEBLE EXT AA BID       fluocinolone (SYNALAR) 0.025 % ointment SEBLE AA BID       gabapentin (NEURONTIN) 100 MG capsule For allergy testing 1 capsule 0     gabapentin (NEURONTIN) 300 MG capsule TK ONE C PO  BID AND 2 CS PO ONCE D FOR TOTAL DOSE OF 4 CS DAILY       HYDROcodone-acetaminophen (NORCO) 5-325 MG tablet Take 1 tablet by mouth       mometasone (ELOCON) 0.1 % external cream Apply topically daily 45 g 1     pimecrolimus (ELIDEL) 1 % external cream Apply topically 2 times daily 60 g 3     progesterone (PROMETRIUM) 200 MG capsule For allergy testing 1 capsule 0     " progesterone (PROMETRIUM) 200 MG capsule        QUEtiapine (SEROQUEL) 100 MG tablet TK SS TO ONE T PO D       QUEtiapine (SEROQUEL) 200 MG tablet For allergy testing 1 tablet 0     tacrolimus (PROTOPIC) 0.1 % external ointment APPLY EXTERNALLY BID       triamcinolone (KENALOG) 0.1 % external cream        triamcinolone (KENALOG) 0.1 % external ointment SEBLE EXT TO ARMS BID FOR 14 DAYS       valACYclovir (VALTREX) 500 MG tablet Take 500 mg by mouth                       Allergy Tests:    Past Allergy Test    Future Allergy Test: 12/8/2020     Order for PATCH TESTS    [x] Outpatient  [] Inpatient: James..../ Bed ....      Skin Atopy (atopic dermatitis) [] Yes   [x] No  Contact allergies:   [] Yes   [x] No  Urticaria/Angioedema  [] Yes   [x] No  Rhinitis/Sinusitis:   [] Yes   [x] No   [] seasonal [] perennial    [] plants?   [] grasses?   [] other?  Allergic Asthma:   [] Yes   [x] No  Pets :                 [x] Yes   [] No - dog  Food Allergy:   [] Yes   [x] No  Drug allergies:               [] Yes   [] No - maybe  Leg ulcers:   [] Yes   [x] No  Hand eczema:   [] Yes   [x] No   Leading hand:     [x] R       [] L       [] Ambidextrous           Reason for tests (suspected allergy): diagnosis of facial erythema (airborne vs photo)  Known previous allergies: no anaphylaxis allergens  Standardized panels  [x] Standard panel (40 tests)  [] Preservatives & Antimicrobials (31 tests)  [] Emulsifiers & Additives (25 tests)   [x] Perfumes/Flavours & Plants (25 tests)  [] Hairdresser panel (12 tests)  [] Rubber Chemicals (22 tests)  [] Plastics (26 tests)  [] Colorants/Dyes/Food additives (20 tests)  [] Metals (implants/dental) (24 tests)  [x] Local anaesthetics/NSAIDs (13 tests) ==> NSAIDs only and do in double with and without UVA exposure  [] Antibiotics & Antimycotics (14 tests)   [] Corticosteroids (15 tests)   [x] Photopatch test (62 tests)   [x] others:  Seroquel, Gabapentin, Amitryptilin, Progesteron with and without UV  exposure      [x] Patient's own products: eucrisa, elidel, ibuprofen/acetaminophen without exposure to UVA    DO NOT test if chemical or biological identity is unknown!     always ask from patient the product information and safety sheets (MSDS)   [x] Atopy screen prick test (Atopic predisposition): ...    Standardized prick panels  [x] Atopic panel (20 tests)  [] Pediatric Panel (12 tests)  [] Milk, Meat, Eggs, Grains (20 tests)   [] Dust, Epithelia, Feathers (10 tests)  [] Fish, Seafood, Shellfish (17 tests)  [] Nuts, Beans (14 tests)  [] Spice, Vegetable, Fruit (17 tests)  [] Others: ...      [] Patient's own products: ...    DO NOT test if chemical or biological identity is unknown!     always ask from patient the product information and safety sheets (MSDS)     Standardized intradermal tests  [x] Penicillium notatum [x] Aspergillus fumigatus [x] House dust mites  [] Bee venom   [] Wasp venom !!Specific protocol with dilutions!!  [] Others: ...    [] Patient needs consultation with Allergy team (changes of tests may apply)  [] Tests discussed with Allergy team (can have direct appointment for allergy tests)     RESULTS & EVALUATION of PATCH TESTS    Patch test readings after     [x] 2 days, [] 3 days [x] 4 days, [] 5 days,    Applied patch tests with results (import here the list of patch tests):    STANDARD Series      No Substance 2 days 4 days remarks   1 Ganga Mix [C] - -    2 Colophony - -    3  2-Mercaptobenzothiazole  - -     4 Methylisothiazolinone - -    5 Carba Mix - -    6 Thiuram Mix [A] - -    7 Bisphenol A Epoxy Resin - -    8 A-Wwgz-Liejuchchuu-Formaldehyde Resin - -    9 Mercapto Mix [A] - -    10 Black Rubber Mix- PPD [B] - -    11 Potassium Dichromate  -  -    12 Balsam of [place] (Myroxylon Pereirae Resin) - -    13 Nickel Sulphate Hexahydrate - -    14 Mixed Dialkyl Thiourea - -    15 Paraben Mix [B] - -    16 Methyldibromo Glutaronitrile - -    17 Fragrance Mix - -    18  2-Bromo-2-Nitropropane-1,3-Diol (Bronopol) - -    19 Lyral - -    20 Tixocortol-21- Pivalate - -    21 Diazolidiyl Urea (Germall II) - -    22 Methyl Methacrylate - -    23 Cobalt (II) Chloride Hexahydrate - -    24 Fragrance Mix II  - -    25 Compositae Mix - -    26 Benzoyl Peroxide - -    27 Bacitracin - -    28 Formaldehyde - -    29 Methylchloroisothiazolinone / Methylisothiazolinone - -    30 Corticosteroid Mix - -    31 Sodium Lauryl Sulfate - -    32 Lanolin Alcohol - -    33 Turpentine - -    34 Cetylstearylalcohol - -    35 Chlorhexidine Dicluconate - -    36 Budenoside - -    37 Imidazolidinyl Urea  - -    38 Ethyl-2 Cyanoacrylate - -    39 Quaternium 15 (Dowicil 200) - -    40 Decyl Glucoside - -      PERFUMES, FLAVORS & PLANTS      No Substance 2 days 4 days remarks   41 1 Benzyl Cinnamate - -    42 2 Di-Limonene (Dipentene) - -    43 3 Cananga Odorata (Ylang Alphonse) (I) - -    44 4 Lichen Acid Mix - -    45 5 Mentha Piperita Oil (Peppermint Oil) - -    46 6 Sesquiterpenelactone mix - -    47 7 Tea Tree Oil, Oxidized - -    48 8 Wood Tar Mix (+) -    49 9 Abietic Acid - -    50 10 Lavendula Angustifolia Oil (Lavender Oil) - -    51 11 Camphor  - -      Fragrance Mix I      52 12 [place] Absolute - -    53 13 Eugenol - -    54 14 Geraniol - -    55 15 Hydroxycitronellal - -    56 16 Isoeugenol - -    57 17 Cinnamic Aldehyde - -    58 18 Cinnamic Alcohol  - -      Fragrance mix II      59 19 Citronellol - -    60 20 Alpha-Hexylcinnamic Aldehyde    - -    61 21 Citral - -    62 22 Farnesol - -    63 23 Coumarin - -      LOCAL ANESTHETICS / NSAIDs    No Substance 2 days 4 days remarks     NSAIDs      64 8 Diclofenac - -    65 9 Ibuprofen - -    66 10 Ketoprofen - -    67 11 Paracetamol - -    68 12 Acetylsalicylic acid - -    69 13 Peroxicam      70 14 Naproxen - -      PHOTOPATCH tests (unexposed)      No Substance 2 days 4 days remarks   71 1 3,4,4'-Triclocarban - -    72 2 Coumarin - -    73 3 Bithiniol -  -    74 4 Usnic Acid - -    75 5 Compositae Mix - -    76 6 Wood Tar Mix - -    77 7 Balsam of [place] (Myroxylon Pereirae Resin) - -    78 8 Hexachlorophene - -    79 9 Chlorhexidine Digluconate - -    80 10 Triclosan - -    81 11 Fragrance Mix - -    82 12 Ketoprofen  - -    83 13 Lichen Acid Mix - -    84 14 Musk Ambrette - -      Sunscreens (UV filters)  -    85 15 P-Aminobenzoic Acid - -    86 16 2-Hydroxy-4-Methoxy Benzophenone (Oxybenzone) - -    87 17 Tert-Butyl-Methoxydibenzoyl Methane  - -    88 18 3-4-Mehyl Benzyliden Camphor  - -    89 19 2-Ethylhexyl-P (Dimethylamino) Benzoate - -    90 20 8-Amyeqyoj-0-Methoxy-4-Methyl Benzophenone - -    91 21 Benzyl Salicylate - -    92 22 Isoamyl-4-Methoxycinnamate - -    93 23 Phenyl Benzimidazole - 5 - Sulfonic Acid - -    94 24 9-Srdwgauhxcotn-5-Hydroxybenzoyl-Benzoic Acid Hexyl Melinda - -    95 25 Menthyl Anthranilate (Meradimate) - -    96 26 Bis-Ethylhexyloxyphenol-Methoxy Phenyl Triazine - -    97 27 Diethylhexyl Butamido Triazone - -    98 28 Disodium Phenyldibenzimidazole Tetrasulfonate - -    99 29 Octocrylene - -    100 30 Octyl Triazone - -    101 31 Tinsorb (Methylene - Bis - Benzotriazyl Tetramethylbutylphenol) - -        Patch Tests   as is  as is 1:2 paraffin 1:2 paraffin       Substance Conc  days  days  days  days remarks   102 103 Seroquel         104 105 Gabapentin         106 107 Amitryptilin         108 109 Progesterone                      OTHER PRODUCTS      No Substance Conc  % solv 2 days 4 days remarks   110 1           PHOTOPATCH tests (exposed)      No Substance 2 days 4 days remarks   111 1 3,4,4'-Triclocarban - -    112 2 Coumarin - -    113 3 Bithiniol - -    114 4 Usnic Acid - -    115 5 Compositae Mix (+) -    116 6 Wood Tar Mix - -    117 7 Balsam of [place] (Myroxylon Pereirae Resin) (+) -    118 8 Hexachlorophene - -    119 9 Chlorhexidine Digluconate - -    120 10 Triclosan - -    121 11 Fragrance Mix (+) -    122 12 Ketoprofen  - -     123 13 Lichen Acid Mix - -    124 14 Musk Ambrette - -      Sunscreens (UV filters)  -    125 15 P-Aminobenzoic Acid - -    126 16 2-Hydroxy-4-Methoxy Benzophenone (Oxybenzone) - -    127 17 Tert-Butyl-Methoxydibenzoyl Methane  - -    128 18 3-4-Mehyl Benzyliden Camphor  - -    129 19 2-Ethylhexyl-P (Dimethylamino) Benzoate - -    130 20 4-Sbrocoqh-0-Methoxy-4-Methyl Benzophenone - -    131 21 Benzyl Salicylate - -    132 22 Isoamyl-4-Methoxycinnamate - -    133 23 Phenyl Benzimidazole - 5 - Sulfonic Acid - -    134 24 0-Kbhnrojgtorfv-4-Hydroxybenzoyl-Benzoic Acid Hexyl Melinda - -    135 25 Menthyl Anthranilate (Meradimate) - -    136 26 Bis-Ethylhexyloxyphenol-Methoxy Phenyl Triazine - -    137 27 Diethylhexyl Butamido Triazone - -    138 28 Disodium Phenyldibenzimidazole Tetrasulfonate - -    139 29 Octocrylene - -    140 30 Octyl Triazone - -    141 31 Tinsorb (Methylene - Bis - Benzotriazyl Tetramethylbutylphenol) - -    Procedure: Apply the same photopatch series 2  times on the back. Remove one patch side for irradiation after 1 day and then irradiate this site with 10 J/cm2 UVA. Remove the other, non-irradiated patch sites on day 2.   Maybe perform on these patients as well standardized UV-B and UV-A MED tests.      Patch Tests   as is  as is 1:2 paraffin 1:2 paraffin       Substance Conc  2 days  4 days  2 days  4 days remarks   142 143 Seroquel  - - - -    144 145 Gabapentin  - - - -    146 147 Amitryptilin  - - (+) -    148 149 Progesterone  - - - -                     Results of patch tests:                         Interpretation:    - Negative                    A    = Allergic      (+) Erythema    TI   = Toxic/irritant   + E + Infiltration    RaP = Relevance at Present     ++ E/I + Papulovesicle   Rpr  = Relevance Previously     +++ E/I/P + Blister     nR   = No Relevance      Atopy Screen 1/11/2021    No Substance Readings (15 min) Evaluation   POS Histamine 1mg/ml +    NEG NaCl 0.9% -      No  Substance Readings (15 min) Evaluation   1 Alternaria alternata (tenuis)  -    2 Cladosporium herbarum -    3 Aspergillus fumigatus -    4 Penicillium notatum -    5 Dermatophagoides pteronyssinus -    6 Dermatophagoides farinae -    7 Dog epithelium (canis spp) -    8 Cat hair (bridget catus) -    9 Cockroach   (Blatella americana & germanica) -    10 Grass mix midwest   ([place], Orchard, Redtop, Joe) -    11 Raul grass (sorghum halepense) -    12 Weed mix   (common Cocklebur, Lamb s quarters, rough redroot Pigweed, Dock/Sorrel) -    13 Mug wort (artemisia vulgare) -    14 Ragweed giant/short (ambrosia spp) -    15 English Plantain (plantago lanceolata) -    16 Tree mix 1 (Pecan, Maple BHR, Oak RVW, american [place], black [place]) -    17 Red cedar (juniperus virginia) -    18 Tree mix 2   (white Giorgi, river/red Birch, black [place], common [place], american Elm) +    19 Box elder/Maple mix (acer spp) -    20 [place] shagbark (carya ovata) -       -      Conclusion: no clear sign for atopy. Slight reaction to tree pollens, but not sure.      Intradermal Testing (Placed 01/11/21 )    No Substance Conc.  Readings (15min) Evaluation   -          + Histamine (prick) 0.1mg / ml -    DF Standard Dust Mite - D. Farinae 1:10 - 3mmP/no E   DP Standard Dust Mite - D. Pteronyssinus 1:10 (+) 4mmP/E   A Aspergillus fumigatus  1:10 - 3mmP/no E   P Penicillium notatum 1:10 - 3mmP/no E     Conclusion: no immediate reaction. Lets see if any delayed reaction    [x] No relevant allergic reaction observed: some irritations in the fragrances/compositae and wood tar mix, particularly after irradiation    [] Allergic reaction diagnosed against following allergens:      Interpretation/ remarks:   See later    [] Patient information given   [] ACDS CAMP information's (# ....) to following compounds: .....   [] General information's to following compounds: ......    ==> final Diagnosis:    1. 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 Possible atopic predisposition  Based off H&P, presenting condition does appear consistent with an allergic contact dermatitis (photo vs airborne). Will plan for patch testing for these conditions with atopic prick/intradermal screening. Will also test patient's medication  Plan   - Will plan for testing (see above)    - Recommended to also bring home medications   - last oral steroids in November 2020   - Alibour no improvement   - no improvement on topical mometasone and Elidel   - Dupixent since June = some stabilization, but no major improvement     - Photodocumentation obtained in clinic   - Prescribed celecoxib 200mg BID PRN for low back pain    Discuss on Friday the possibilities of vasovagal vasodilation and eczematous component. Maybe anticholinergic drugs with anti-atopic dermatitis drugs (Dupixent + LAXMI inhibitor?)    These conclusions are made at the best of one's knowledge and belief based on the provided evidence such as patient's history and allergy test results and they can change over time or can be incomplete because of missing information's.    ==> Treatment prescribed/Plan:  See later      Follow-up: in Derm-Allergy clinic for 2nd readings and final conclusions after 4 days (in person)      Thank you for the opportunity be involved in the care of this patient.     Staff:  Aditi Mukherjee LPN & Marialuisa Liu RN    I spent a total of 18 minutes face to face with Marialuisa Gongora during today s office visit. Over 50% of this time was spent discussing all the individual test results, correlating them to the clinical relevance, counseling the patient and/or coordinating care. Please see Assessment and Plan for details.

## 2024-05-10 ENCOUNTER — OFFICE VISIT (OUTPATIENT)
Dept: NEUROLOGY | Facility: CLINIC | Age: 64
End: 2024-05-10
Payer: COMMERCIAL

## 2024-05-10 VITALS
DIASTOLIC BLOOD PRESSURE: 78 MMHG | SYSTOLIC BLOOD PRESSURE: 121 MMHG | TEMPERATURE: 97.9 F | HEART RATE: 70 BPM | HEIGHT: 67 IN | WEIGHT: 134 LBS | BODY MASS INDEX: 21.03 KG/M2

## 2024-05-10 DIAGNOSIS — R44.2 PHANTOSMIA: ICD-10-CM

## 2024-05-10 DIAGNOSIS — I95.1 ORTHOSTASIS: ICD-10-CM

## 2024-05-10 DIAGNOSIS — G47.52 DREAM ENACTMENT BEHAVIOR: ICD-10-CM

## 2024-05-10 DIAGNOSIS — R41.89 COGNITIVE DECLINE: Primary | ICD-10-CM

## 2024-05-10 RX ORDER — THYROID 15 MG/1
15 TABLET ORAL DAILY
COMMUNITY

## 2024-05-10 NOTE — PROGRESS NOTES
CC:   Chief Complaint   Patient presents with    New Patient       HPI   Ms. Marialuisa Gongora is a 64 year old right-handed female with a past medical history of breast cancer in 2012 (s/p bilateral mastectomy, no chemotherapy or radiation therapy, status post breast implants), history of anorexia nervosa, positive Lyme serology (2020, status post doxycycline treatment), spinal stenosis of the lumbar region (s/p lumbar spinal fusion and neurostimulator for chronic pain), B12 injections (due to low B12 despite oral supplementation, which started January 2024), atrial fibrillation on aspirin, hypertension, hypothyroidism, displacement of cervical intervertebral disc, status post left hip replacement, and Dupixent therapy for inflammatory skin condition.    Today Marialuisa is here with her  Ozzie.    In January she had a cervical fusion with hardware put in. At 4am in February, she went out the garage door but has no recollection of this. It tripped their home alarm system. Ozzie was out of town got a call from their home alarm company stating that the alarm had been tripped. He could not get a hold of Marialuisa and called a wellness check. The police came and found Marialuisa looking healthy and well by all accounts. Of note, she was off all narcotic pain medication at this time and she denies any sleeping pills, CBD, marijuana products, or any other sedating medications. Ozzie finally got in touch with her at about 7am and explained that she had tripped the alarm, but she was unable to provide any clarity on what had happened. She remembers now that the police were there but in the moment when her  first asked her she was uncertain. Later that morning at around 10:30AM she was standing at the top of the stairs getting ready to work out and ended up blacking out and fell down 16 stairs, resulting in upper cervical vertebral injury. She denies any loss of consciousness around the time of that fall.     Two days  "after she returned from the hospital she was standing at the sink and blacked out again and fell. Her  reports dizzy spells predominantly from sitting the standing. They deny ever being told to have orthostasis, but Marialuisa underwent an echo and Holter that were unexplanatory.    She also has more persistent cognitive trouble: Marialuisa and Ozzie argue about whether or not they have discussed things. Their daughter Teri has also mentioned her repeating conversations intermittently. This is usually things that were discussed within one to seven days.     No issues with constipation or urinary frequency/retention. She does struggle with chronic dry eyes.     She does not have an issue with her sense of smell but does note that she often will smell cigarette smoke (several times per week) when others do not notice this. She has never had a seizure to her knowledge.     Mood/behavior: She gets down when she can't get out and move around because she is an extremely active person. Otherwise she is in good spirits. No hallucinations, delusions, or paranoia.   Sleep: She sleeps ok at night, she is a light sleeper. She and Ozzie share a bed. She often talks in her sleep and moves her hands. Ozzie denies any obviously violent behavior, but he does report that \"it looks like she's in a dream\". She sleeps from about 11pm-7am. It is unsure what time of night the possible dream enactment happens. No snoring.   Motor: Difficulty with going from sitting to standing and blackout spells (see HPI), balance was definitely impacted after her concussion. She is extremely active and walks several miles per day. She has a little bit of reported left foot drop from multiple back surgeries but is careful to avoid issues with this.   Background medical problems: Two neck surgeries as described above. No recent illness. No recent fevers. She has lost some weight but this is due to some issues she has had with swallowing since her " "surgeries. She has noted that some foods have become difficult to swallow (like ground beef) without fluids.   ADLs: Independent     MEDS:  Other than amitriptyline, no sedating/recreational/psychotropic medications.      PROBLEM LIST:  Patient Active Problem List   Diagnosis    Controlled substance agreement signed    Spinal stenosis of lumbar region    S/P lumbar spinal fusion    History of anorexia nervosa    Elevated BP without diagnosis of hypertension        PMHx:  Past Medical History:   Diagnosis Date    Displacement of cervical intervertebral disc     Elevated BP without diagnosis of hypertension     Herpes labialis 04/01/2016    History of anorexia nervosa     History of bilateral mastectomy     Infiltrating ductal carcinoma of left breast (H) 2012    Lyme disease     2012    Pneumothorax, right 12/11/2016       Surgical HX:  Past Surgical History:   Procedure Laterality Date    BACK SURGERY      C4-5 fusion; L4-5 diskectomy/laminectomy x 2; L3-S1 fusion;    IR LUMBAR EPIDURAL STEROID INJECTION  10/13/2003    IR LUMBAR EPIDURAL STEROID INJECTION  11/14/2003    LAPAROTOMY EXPLORATORY      r oophorectomy    MASTECTOMY MODIFIED RADICAL Bilateral         FHx:  No family history of neurological disease.     SHx:  Patient's Occupation: Retired RN   Head trauma history: Yes: see HPI for recent concussion. No other history of head trauma.   Alcohol and drug screening: maybe two drinks if they go out to eat. No other marijuana or drug use.   History of neurodevelopmental issues? No    OBJECTIVE     Exam:    MMSE: 27/30    Year, month, date, day of week, season: 4/5 (-1 date off by one)  State, city, [facility], clinic name, floor: 4/5 (-1 [facility]; said [place] but Ozzie Admits that he incorrectly told her this earlier)  Immediate three word recall: 3/3  Spell WORLD backwards: 5/5  Repeat \"no ifs ands or buts:\" 1/1  Correctly identify/name two objects: 2/2  Pentagons: 0/1  Write a sentence: 1/1  Close your eyes: " 1/1  Three step command: 3/3  Delayed recall: 3/3      Neuro Exam: Alert, awake, normal conversant and logical speech. Wearing a c-spire hard collar. No dysarthria. No parkinsonism. Normal gait but unable to tandem walk. Rhomberg negative. Normal peripheral visual fields without visual simultagnosia. Normal saccadic initiation, velocity and amplitude. Normal appendicular muscle tone. Normal appendicular deep tendon reflexes. Normal vibratory sense in the distal limbs, except for reduced sensation in the left lower limb everywhere distal to the knee. Normal finger-to-nose. No tremor.      Objective Testing:    CMP (1/12/21): normal except potassium (5.1) and alk phosphatase (47)  CBC (1/12/21): normal except WBC (4.0), RBC (3.99)< MCV (101), and MCH (36.3)      TEST LOCATION RESULT DATE    CT Head  [facility]  1.  No CT evidence for acute intracranial process.   2.  Brain atrophy and presumed chronic microvascular ischemic changes as above.  2/27/24   CT Head  [facility]  Mild global atrophy 7/16/12          ASSESSMENT/PLAN   The clinical picture is of persistent, but mild, cognitive symptoms punctuated by confusional spells, orthostasis, phantosmias, and dream enactment behavior. Her fall down the stairs likely resulted in a concussion, which also could be playing a role in her current cognitive status.    I am concerned about a possible developing alpha-synucleinopathy, which could explain all of the above symptoms. The other alternatives involve several disparate entities all coming together at once. We discussed the following plan:     #Confirm whether or not you have orthostasis     - Take blood pressure laying down, 1st thing in the morning, then take blood pressure again 1 minute after standing up, making sure that her arm is loose and relaxed. Then, take blood pressure again at 3 minutes after standing.    #Dream enactment behavior, cognitive concerns  #Possible fluctuations in cognition  #Phantom cigarette  smells    - MRI brain  - Referral to [facility] for consideration of polysomnogram and alpha-synuclein skin test  - EEG, routine (as part of evaluation for non-convulsive confusion spells)    #Self-reported differential in upper limb blood pressure (right lower than left)  - Depending on the above work-up for fainting, we can decide on pursuing an MRA neck (evaluate for subclavian steal syndrome, although she denies any arm exercise as a provoking factor for fainting spells)    - Return visit with me afterwards

## 2024-05-10 NOTE — PATIENT INSTRUCTIONS
#Orthostasis     - Take blood pressure laying down, 1st thing in the morning, then take blood pressure again 1 minute after standing up, making sure that her arm is loose and relaxed. Then, take blood pressure again at 3 minutes after standing.    #Dream enactment behavior, cognitive concerns  #Possible fluctuations in cognition  #Phantom cigarette smells    - MRI brain  - Referral to [facility] for consideration of polysomnogram and alpha-synuclein skin test  - EEG, routine    #Self-reported differential in upper limb blood pressure (right lower than left)  - Depending on the above work-up for fainting, we can decide on pursuing an MRA neck (evaluate for subclavian steal syndrome)

## 2024-05-10 NOTE — LETTER
5/10/2024       RE: Marialuisa Gongora  [address]  [place] [address]     Dear Colleague,    Thank you for referring your patient, Marialuisa Gongora, to the  [facility] at [facility]. Please see a copy of my visit note below.    CC:   Chief Complaint   Patient presents with    New Patient       HPI   Ms. Marialuisa Gongora is a 64 year old right-handed female with a past medical history of breast cancer in 2012 (s/p bilateral mastectomy, no chemotherapy or radiation therapy, status post breast implants), history of anorexia nervosa, positive Lyme serology (2020, status post doxycycline treatment), spinal stenosis of the lumbar region (s/p lumbar spinal fusion and neurostimulator for chronic pain), B12 injections (due to low B12 despite oral supplementation, which started January 2024), atrial fibrillation on aspirin, hypertension, hypothyroidism, displacement of cervical intervertebral disc, status post left hip replacement, and Dupixent therapy for inflammatory skin condition.    Today Marialuisa is here with her  Ozzie.    In January she had a cervical fusion with hardware put in. At 4am in February, she went out the garage door but has no recollection of this. It tripped their home alarm system. Ozzie was out of town got a call from their home alarm company stating that the alarm had been tripped. He could not get a hold of Marialuisa and called a wellness check. The police came and found Marialuisa looking healthy and well by all accounts. Of note, she was off all narcotic pain medication at this time and she denies any sleeping pills, CBD, marijuana products, or any other sedating medications. Ozzie finally got in touch with her at about 7am and explained that she had tripped the alarm, but she was unable to provide any clarity on what had happened. She remembers now that the police were there but in the moment when her  first asked  "her she was uncertain. Later that morning at around 10:30AM she was standing at the top of the stairs getting ready to work out and ended up blacking out and fell down 16 stairs, resulting in upper cervical vertebral injury. She denies any loss of consciousness around the time of that fall.     Two days after she returned from the hospital she was standing at the sink and blacked out again and fell. Her  reports dizzy spells predominantly from sitting the standing. They deny ever being told to have orthostasis, but Marialuisa underwent an echo and Holter that were unexplanatory.    She also has more persistent cognitive trouble: Marialuisa and Ozzie argue about whether or not they have discussed things. Their daughter Teri has also mentioned her repeating conversations intermittently. This is usually things that were discussed within one to seven days.     No issues with constipation or urinary frequency/retention. She does struggle with chronic dry eyes.     She does not have an issue with her sense of smell but does note that she often will smell cigarette smoke (several times per week) when others do not notice this. She has never had a seizure to her knowledge.     Mood/behavior: She gets down when she can't get out and move around because she is an extremely active person. Otherwise she is in good spirits. No hallucinations, delusions, or paranoia.   Sleep: She sleeps ok at night, she is a light sleeper. She and Ozzie share a bed. She often talks in her sleep and moves her hands. Ozzie denies any obviously violent behavior, but he does report that \"it looks like she's in a dream\". She sleeps from about 11pm-7am. It is unsure what time of night the possible dream enactment happens. No snoring.   Motor: Difficulty with going from sitting to standing and blackout spells (see HPI), balance was definitely impacted after her concussion. She is extremely active and walks several miles per day. She has a little bit " of reported left foot drop from multiple back surgeries but is careful to avoid issues with this.   Background medical problems: Two neck surgeries as described above. No recent illness. No recent fevers. She has lost some weight but this is due to some issues she has had with swallowing since her surgeries. She has noted that some foods have become difficult to swallow (like ground beef) without fluids.   ADLs: Independent     MEDS:  Other than amitriptyline, no sedating/recreational/psychotropic medications.      PROBLEM LIST:  Patient Active Problem List   Diagnosis    Controlled substance agreement signed    Spinal stenosis of lumbar region    S/P lumbar spinal fusion    History of anorexia nervosa    Elevated BP without diagnosis of hypertension        PMHx:  Past Medical History:   Diagnosis Date    Displacement of cervical intervertebral disc     Elevated BP without diagnosis of hypertension     Herpes labialis 04/01/2016    History of anorexia nervosa     History of bilateral mastectomy     Infiltrating ductal carcinoma of left breast (H) 2012    Lyme disease     2012    Pneumothorax, right 12/11/2016       Surgical HX:  Past Surgical History:   Procedure Laterality Date    BACK SURGERY      C4-5 fusion; L4-5 diskectomy/laminectomy x 2; L3-S1 fusion;    IR LUMBAR EPIDURAL STEROID INJECTION  10/13/2003    IR LUMBAR EPIDURAL STEROID INJECTION  11/14/2003    LAPAROTOMY EXPLORATORY      r oophorectomy    MASTECTOMY MODIFIED RADICAL Bilateral         FHx:  No family history of neurological disease.     SHx:  Patient's Occupation: Retired RN   Head trauma history: Yes: see HPI for recent concussion. No other history of head trauma.   Alcohol and drug screening: maybe two drinks if they go out to eat. No other marijuana or drug use.   History of neurodevelopmental issues? No    OBJECTIVE     Exam:    MMSE: 27/30    Year, month, date, day of week, season: 4/5 (-1 date off by one)  State, city, county, clinic name,  "floor: 4/5 (-1 [facility]; said [place] but Ozzie Admits that he incorrectly told her this earlier)  Immediate three word recall: 3/3  Spell WORLD backwards: 5/5  Repeat \"no ifs ands or buts:\" 1/1  Correctly identify/name two objects: 2/2  Pentagons: 0/1  Write a sentence: 1/1  Close your eyes: 1/1  Three step command: 3/3  Delayed recall: 3/3      Neuro Exam: Alert, awake, normal conversant and logical speech. Wearing a c-spire hard collar. No dysarthria. No parkinsonism. Normal gait but unable to tandem walk. Rhomberg negative. Normal peripheral visual fields without visual simultagnosia. Normal saccadic initiation, velocity and amplitude. Normal appendicular muscle tone. Normal appendicular deep tendon reflexes. Normal vibratory sense in the distal limbs, except for reduced sensation in the left lower limb everywhere distal to the knee. Normal finger-to-nose. No tremor.      Objective Testing:    CMP (1/12/21): normal except potassium (5.1) and alk phosphatase (47)  CBC (1/12/21): normal except WBC (4.0), RBC (3.99)< MCV (101), and MCH (36.3)      TEST LOCATION RESULT DATE    CT Head  [facility]  1.  No CT evidence for acute intracranial process.   2.  Brain atrophy and presumed chronic microvascular ischemic changes as above.  2/27/24   CT Head  [facility]  Mild global atrophy 7/16/12          ASSESSMENT/PLAN   The clinical picture is of persistent, but mild, cognitive symptoms punctuated by confusional spells, orthostasis, phantosmias, and dream enactment behavior. Her fall down the stairs likely resulted in a concussion, which also could be playing a role in her current cognitive status.    I am concerned about a possible developing alpha-synucleinopathy, which could explain all of the above symptoms. The other alternatives involve several disparate entities all coming together at once. We discussed the following plan:     #Confirm whether or not you have orthostasis     - Take blood pressure laying down, 1st thing " in the morning, then take blood pressure again 1 minute after standing up, making sure that her arm is loose and relaxed. Then, take blood pressure again at 3 minutes after standing.    #Dream enactment behavior, cognitive concerns  #Possible fluctuations in cognition  #Phantom cigarette smells    - MRI brain  - Referral to [facility] for consideration of polysomnogram and alpha-synuclein skin test  - EEG, routine (as part of evaluation for non-convulsive confusion spells)    #Self-reported differential in upper limb blood pressure (right lower than left)  - Depending on the above work-up for fainting, we can decide on pursuing an MRA neck (evaluate for subclavian steal syndrome, although she denies any arm exercise as a provoking factor for fainting spells)    - Return visit with me afterwards      Again, thank you for allowing me to participate in the care of your patient.      Sincerely,    Zeyad George MD

## 2024-05-22 ENCOUNTER — TRANSFERRED RECORDS (OUTPATIENT)
Dept: HEALTH INFORMATION MANAGEMENT | Facility: CLINIC | Age: 64
End: 2024-05-22
Payer: COMMERCIAL

## 2024-05-27 ENCOUNTER — MYC MEDICAL ADVICE (OUTPATIENT)
Dept: NEUROLOGY | Facility: CLINIC | Age: 64
End: 2024-05-27

## 2024-06-08 ASSESSMENT — SLEEP AND FATIGUE QUESTIONNAIRES
HOW LIKELY ARE YOU TO NOD OFF OR FALL ASLEEP WHILE SITTING INACTIVE IN A PUBLIC PLACE: WOULD NEVER DOZE
HOW LIKELY ARE YOU TO NOD OFF OR FALL ASLEEP WHILE SITTING QUIETLY AFTER LUNCH WITHOUT ALCOHOL: WOULD NEVER DOZE
HOW LIKELY ARE YOU TO NOD OFF OR FALL ASLEEP IN A CAR, WHILE STOPPED FOR A FEW MINUTES IN TRAFFIC: WOULD NEVER DOZE
HOW LIKELY ARE YOU TO NOD OFF OR FALL ASLEEP WHILE LYING DOWN TO REST IN THE AFTERNOON WHEN CIRCUMSTANCES PERMIT: MODERATE CHANCE OF DOZING
HOW LIKELY ARE YOU TO NOD OFF OR FALL ASLEEP WHILE WATCHING TV: SLIGHT CHANCE OF DOZING
HOW LIKELY ARE YOU TO NOD OFF OR FALL ASLEEP WHILE SITTING AND READING: WOULD NEVER DOZE
HOW LIKELY ARE YOU TO NOD OFF OR FALL ASLEEP WHEN YOU ARE A PASSENGER IN A CAR FOR AN HOUR WITHOUT A BREAK: WOULD NEVER DOZE
HOW LIKELY ARE YOU TO NOD OFF OR FALL ASLEEP WHILE SITTING AND TALKING TO SOMEONE: WOULD NEVER DOZE

## 2024-06-10 ENCOUNTER — OFFICE VISIT (OUTPATIENT)
Dept: SLEEP MEDICINE | Facility: CLINIC | Age: 64
End: 2024-06-10
Payer: COMMERCIAL

## 2024-06-10 VITALS
HEIGHT: 67 IN | OXYGEN SATURATION: 100 % | WEIGHT: 133.8 LBS | HEART RATE: 90 BPM | SYSTOLIC BLOOD PRESSURE: 114 MMHG | BODY MASS INDEX: 21 KG/M2 | DIASTOLIC BLOOD PRESSURE: 71 MMHG

## 2024-06-10 DIAGNOSIS — F51.04 PSYCHOPHYSIOLOGICAL INSOMNIA: ICD-10-CM

## 2024-06-10 DIAGNOSIS — G47.52 RBD (REM BEHAVIORAL DISORDER): Primary | ICD-10-CM

## 2024-06-10 PROCEDURE — 99204 OFFICE O/P NEW MOD 45 MIN: CPT | Performed by: PSYCHIATRY & NEUROLOGY

## 2024-06-10 RX ORDER — CLONAZEPAM 0.5 MG/1
TABLET ORAL
Qty: 2 TABLET | Refills: 0 | Status: SHIPPED | OUTPATIENT
Start: 2024-06-10

## 2024-06-10 NOTE — NURSING NOTE
"Chief Complaint   Patient presents with    Sleep Problem     Passing out, talking in sleep        Initial /71   Pulse 90   Ht 1.702 m (5' 7.01\")   Wt 60.7 kg (133 lb 12.8 oz)   SpO2 100%   BMI 20.95 kg/m   Estimated body mass index is 20.95 kg/m  as calculated from the following:    Height as of this encounter: 1.702 m (5' 7.01\").    Weight as of this encounter: 60.7 kg (133 lb 12.8 oz).    Medication Reconciliation: complete  ESS 3  Neck circumference:  31 centimeters.  Socrates Akbar MA       "

## 2024-06-10 NOTE — PATIENT INSTRUCTIONS
Behavioral Sleep Medicine Program    The [facility] Behavioral Sleep Medicine Program,  provides non-drug treatment for sleep problems as part of our multi-discipline sleep medicine program. Services offered include:    Cognitive-behavioral Therapies for Insomnia (CBT-I)  Management of Shift-work and Jet Lag Sleep Disorders  Management of Delayed, Advanced and Irregular Circadian Rhythm Sleep Disorders  Imagery Rehearsal Therapy (IRT) for Nightmare Disorder  Adaptation to PAP Therapy for Obstructive Sleep Apnea  Behavioral strategies to manage hypersomnia and fatigue    You have been referred for consultation with a sleep psychologist who specializes in behavioral sleep medicine and treatment of insomnia.  The [facility] Program offers individualized telehealth services through our [facility] and online CBT-I.    Preparing for your Consultation    We recommmend you keep a Sleep Diary for at least a week prior to your visit. Complete the sleep diary each day first thing after you get up by answering a few key questions about your sleep using our convenient mobile darron or paper sleep diary.  Your answers should be based on your recall of the past 24 hours.  Avoid watching the clock or recording data during the night.     Insomnia  Darron    The Insomnia  mobile darron  is a convenient way to keep track of your sleep prior to your sleep consultation.  Simply download the free darron on your Apple or Android phone and record your information each morning.  The darron includes training, self-assessment, and sleep schedule recommendations.  Prior to your consultation we recommend you use only the sleep diary function. You can e-mail yourself a copy of your sleep diary data by going to the Settings section and using the [place] User Data function.  During your consultation your provider will review the data with you.          [facility] Sleep Diary    You  can also track your sleep using the [facility] paper sleep diary.  You can upload your sleep diary and send it via a Wi3 message, fax it to [phone], or have it with you at the time of your consultation.            CBT-I:  Frequently Asked Questions    What is CBT-I?    Cognitive Behavioral Therapy for Insomnia, also known as CBT-I, is a highly effective non-drug treatment for insomnia. The American College of Physicians recommends CBT-I as the first treatment for chronic insomnia.  Research has shown CBT-I to be safer and more effective long term than sleeping pills.    What does CBT-I involve?     CBT-I targets behaviors that lead to chronic insomnia:  Habits that weaken the bed as a cue for sleep  Habits that weaken your body's sleep drive and sleep/wake clock   Unhelpful sleep thoughts that increase sleep-related worry and arousal.    The process involves 3-6 telehealth visits that guide you to implement proven strategies to get a better night's sleep.    People often see improvement in their sleep within a few weeks. Research shows if you keep practicing the skills you learn your sleep is likely to continue to improve 6-12 months after treatment.    Does this program prescribe or manage sleep medication?    No.  Your prescribing provider is responsible to assist you in managing your sleep medications.  Some people choose to stop using sleep medication prior to or during CBT-I.  Our program can work with your prescribing provider to help reduce or eliminate use of sleep medications.     Getting Started Today!    If you haven't already done so, we recommend you consider making the following changes to your sleep habits prior to your sleep consultation:     Reduce your consumption of caffeine and alcohol.  Both can disrupt sleep and make strengthening your sleep more difficult.  Specifically:    - Avoid caffeine within 6 hours of bedtime   - No more than 3 caffeinated beverages per day (e.g. 8 oz.  "cup coffee or 12 oz. cup soda)            - No alcohol within 3 hours of bedtime    Make sure your bedroom is quiet, comfortable and dark.  Noise, light and an uncomfortable sleep space can harm your sleep.      Keep the same sleep schedule 7 days a week.unless you do shift work.      Our Online CBT-I Program    If you want to get started today, research indicates that online CBT-I can be effective for some individuals. These programs requires comfort with gauri-based or online learning.  However, digital CBT-I programs are not for everyone.  Contraindications include:    Seizure disorders,   Bipolar disorder,   Unstable medical or mental health conditions,   Frailty or risk of falling  Pregnancy    You should consult a sleep specialist before using these resources if you have:    Sleep Apnea  Restless Leg Syndrome  Sleep Walking  REM behavior disorder  Night Terrors  Excessive Daytime Sleepiness  Are engaged in shift work  Use prescription sleep medication    Click on the link below to get started today:                                  [email or web address]             Once you are registered you can share your sleep log data with [facility] where your health provider will be able to review your sleep log and progress.  Once you begin the program, go to the left side navigation bar and click on the  share sleep log  button:                                        This takes you to the next page where you enter the provider code [email or web address] and click Locate:                 This brings you to the verification page where you should see [facility] identified as your provider                                                                           By clicking \"Submit\" your sleep log data will be sent to our secure [facility] portal for review by you provider.     For Help Contact Customer Care At:    [email or web address]  If your sleep provider recommends online " CBT-I for you , the cost for an entire 6-week program is $40.    To get started, copy and paste the link below which will take you to the landing page to register:                              Self-help Workbooks for Insomnia    If you have found self-help books useful in the past, you may want to consider reading one of the following books prior to your consultation:    Say Bladimir to Insomnia: The Six-Week, Drug-Free Program Developed at [facility].  Sen Irby MD. Available in paperback, Natalie, and audiobook.    Overcoming Insomnia: A Cognitive-Behavioral Therapy Approach, Workbook.  Pola Torres, PhD  and Annie Jones, PhD.  Available in paperback and Natalie.    Quiet Your Mind and Get to Sleep: Solutions to Insomnia for Those with Depression, Anxiety, or Chronic Pain.  Gena Tenorio, PhD and Annie Jones, PhD.  Available in paperback and Natalie

## 2024-06-10 NOTE — PROGRESS NOTES
Outpatient Sleep Medicine Consultation:      Name: Marialuisa Gongora MRN# [id number]   Age: 64 year old YOB: 1960     Date of Consultation: Deedee 10, 2024  Consultation is requested by: No referring provider defined for this encounter. No ref. provider found  Primary care provider: No Ref-Primary, Physician       Reason for Sleep Consult:     Marialuisa Gongora is sent by No ref. provider found for a sleep consultation regarding evaluation for RBD.    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       Summary Sleep Diagnoses:  Psychophysiological  Insomnia: chronic  Possible REM behavior disorder    Comorbid Diagnoses:  Hypertension  Hypothyroidism  displacement of cervical intervertebral disc       Summary Recommendations:  - For possible dream enactment behavior along with symptoms of constipation, gait/balance issues, orthostasis and some memory concerns- recommend in-lab PSG with 4-limb montage to evaluate for RSWA  - Clonazepam 0.5mg x2 provided for insomnia for night of the sleep study. Patient instructed to take 1 tab, if not helpful for sleep, can take a second tab if needed   - For chronic insomnia, recommend CBT-I with sleep psychology. Patient was also  provided with resources like link to online CBT-I course, list of sleep-help workbooks for insomnia   - Discussed the importance of driving only while alert    Follow up in RBD clinic after sleep study     Orders Placed This Encounter   Procedures     Comprehensive Sleep Study    Behavioral Sleep Medicine [facility] Referral     Patient was seen and discussed with Dr. Franklin Cotton MD   Sleep Medicine Fellow       Summary Counseling:    Sleep Testing Reviewed  Insomnia Reviewed    Medical Decision-making:   Educational materials provided in instructions    Outpatient Sleep Medicine Staff  I have seen and evaluated the patient and discussed with the sleep fellow on 6-10-24.  I supervised the visit and agree with the documentation.      In addition to face to face time I have also reviewed the patients chart, coordinated care, assisted in placing orders and documentation.  Total time (Face-to-Face, care coordination, orders, documentation) spent on 6-10-24 was greater than 45 minutes.     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      accompanies the patient today who reports history of somniloquy for many years 1-2 times a year, but unclear motor activity in his sleep. Patient denies any falls off of the bed in her sleep.     She does endorse history of onset insomnia due to her inability to turn off her mind at night.     Neurodegenerative Symptoms screen:  +constipation  No change in ability in smell  + gait or balance issues  + orthostasis  No tremor  Slight memory changes- after a concussion   No hallucinations    Past Sleep Evaluations: None     SLEEP-WAKE SCHEDULE:     Work/School Days: Patient goes to school/work: No   Usually gets into bed at 10:30pm  Takes patient about 30-60 minutes to fall asleep  Has trouble falling asleep 7 nights per week  Wakes up in the middle  of the night 0-1 times.  Wakes up due to External stimuli (bed partner, pets, noise, etc)  She has trouble falling back asleep 5 times a week.   It usually takes 20-30min to get back to sleep  Patient is usually up at 7:30  Uses alarm: No    Weekends/Non-work Days/All Other Days:  Usually gets into bed at 10:30   Takes patient about 30-60 min to fall asleep  Patient is usually up at 7:30am  Uses alarm: No    Sleep Need  Patient gets  6-7hrs sleep on average   Patient thinks she needs about 7 sleep    Marialuisa Gongora prefers to sleep in this position(s): Side   Patient states they do the following activities in bed: Read;Watch TV;Use phone, computer, or tablet    Naps  Patient takes a purposeful nap 2/wk times a week and naps are usually 30-60 min in duration  She feels better after a nap: Yes  She dozes off unintentionally Never days per week  Patient has had a driving accident or near-miss due to sleepiness/drowsiness: No      SLEEP DISRUPTIONS:    Breathing/Snoring  Patient snores:No  Other people complain about her snoring: No  Patient has been told she stops breathing in her sleep:No  She has issues with the following: Stuffy nose when you wake up    Movement:  Patient gets pain, discomfort, with an urge to move:  No  It happens when she is resting:  No  It happens more at night:     Patient has been told she kicks her legs at night:  No     Behaviors in Sleep:  Marialuisa Gongora has experienced the following behaviors while sleeping: Sleep-talking  She has experienced sudden muscle weakness during the day: No      Is there anything else you would like your sleep provider to know: No    CAFFEINE AND OTHER SUBSTANCES:    Patient consumes caffeinated beverages per day:  1.5 cups of coffee in the morning  Last caffeine use is usually: 10:00am  List of any prescribed or over the counter stimulants that patient takes: N/A  List of any prescribed or over the counter sleep medication patient takes: Amitriptyline,  "Nalaxtone, ambien, lunesta, seroquel  List of previous sleep medications that patient has tried: Nalaxtone  Patient drinks alcohol to help them sleep: No  Patient drinks alcohol near bedtime: No    Family History:  Patient has a family member been diagnosed with a sleep disorder: No        SCALES:    EPWORTH SLEEPINESS SCALE         6/8/2024     1:55 PM    [place] Sleepiness Scale ( MARCIAL Cortes  [id number]<br>ESS - [place]/English - Final version - 21 Nov 07 - [facility] [place].)   Sitting and reading Would never doze   Watching TV Slight chance of dozing   Sitting, inactive in a public place (e.g. a theatre or a meeting) Would never doze   As a passenger in a car for an hour without a break Would never doze   Lying down to rest in the afternoon when circumstances permit Moderate chance of dozing   Sitting and talking to someone Would never doze   Sitting quietly after a lunch without alcohol Would never doze   In a car, while stopped for a few minutes in traffic Would never doze   [place] Score (MC) 3   [place] Score (Sleep) 3         INSOMNIA SEVERITY INDEX (CAPRI)           No data to display                Guidelines for Scoring/Interpretation:  Total score categories:  0-7 = No clinically significant insomnia   8-14 = Subthreshold insomnia   15-21 = Clinical insomnia (moderate severity)  22-28 = Clinical insomnia (severe)  Used via courtesy of www.Snapsheetealth.va.gov with permission from Alex Turner PhD., [facility]      STOP BANG         6/10/2024    12:00 PM   STOP BANG Questionnaire (  2008, the American Society of Anesthesiologists, Inc. [employer]   Neck Cir (cm) Clinic: 31 cm   B/P Clinic: 114/71   BMI Clinic: 20.95         GAD7         No data to display                  CAGE-AID         No data to display            CAGE-AID reprinted with permission from the [place] Medical Journal, VIDHYA Odonnell. and MARK Oconnor, \"Conjoint screening questionnaires for alcohol and drug " "abuse\" [place] Viewglass Journal 94: 135-140, 1995.      PATIENT HEALTH QUESTIONNAIRE-9 (PHQ - 9)         No data to display            Developed by Eliseo Momin, Marissa Akbar, Tommie Franlkin and colleagues, with an educational ragini from [employer] No permission required to reproduce, translate, display or distribute.        Allergies:    Allergies   Allergen Reactions    Codeine Itching    Baclofen Other (See Comments)    Minocycline     Other Environmental Allergy      Patch Test Results 12-10-19    Very Strong/Strong  None    Mild  None    Borderline (poessible irritation, may not be allergic)  Centrimonium chloride  Methenamine (formaldehyde)  Formaldehyde    Penicillins Hives     5-23 had hives as a childhood rxn - HAS HAD CSN in the past without problem  THROAT SWELLS AND HIVES      Sulfa Antibiotics Unknown       Medications:    Current Outpatient Medications   Medication Sig Dispense Refill    amitriptyline (ELAVIL) 100 MG tablet       Apoaequorin (PREVAGEN) 10 MG CAPS Take 1 capsule by mouth daily      Desoximetasone (TOPICORT LP) 0.05 % OINT Externally apply topically 2 times daily as needed 60 g 3    DUPIXENT 300 MG/2ML syringe Inject 300 mg Subcutaneous every 14 days      magnesium sulfate 500 mg/mL SOLN Take 1 tablet by mouth daily      progesterone (PROMETRIUM) 200 MG capsule Take 200 mg by mouth daily      thyroid (ARMOUR) 15 MG tablet Take 15 mg by mouth daily      amitriptyline (ELAVIL) 10 MG tablet For allergy testing 1 tablet 0    Dupilumab (DUPIXENT) 300 MG/2ML syringe Inject 2 mLs (300 mg) Subcutaneous every 14 days 4 mL 2    gabapentin (NEURONTIN) 100 MG capsule For allergy testing 1 capsule 0    progesterone (PROMETRIUM) 200 MG capsule For allergy testing 1 capsule 0       Problem List:  Patient Active Problem List    Diagnosis Date Noted    History of anorexia nervosa 04/26/2019     Priority: Medium    Controlled substance agreement signed 04/04/2019     Priority: Medium     " Rupesh Davila MD [facility]   Neena Doranell CMA....4/4/2019 3:15 PM      Elevated BP without diagnosis of hypertension 12/12/2016     Priority: Medium    Spinal stenosis of lumbar region 06/29/2015     Priority: Medium    S/P lumbar spinal fusion 06/29/2015     Priority: Medium        Past Medical/Surgical History:  Past Medical History:   Diagnosis Date    Displacement of cervical intervertebral disc     Elevated BP without diagnosis of hypertension     Herpes labialis 04/01/2016    History of anorexia nervosa     History of bilateral mastectomy     Infiltrating ductal carcinoma of left breast (H) 2012    Lyme disease     2012    Pneumothorax, right 12/11/2016     Past Surgical History:   Procedure Laterality Date    BACK SURGERY      C4-5 fusion; L4-5 diskectomy/laminectomy x 2; L3-S1 fusion;    IR LUMBAR EPIDURAL STEROID INJECTION  10/13/2003    IR LUMBAR EPIDURAL STEROID INJECTION  11/14/2003    LAPAROTOMY EXPLORATORY      r oophorectomy    MASTECTOMY MODIFIED RADICAL Bilateral        Social History:  Social History     Socioeconomic History    Marital status:      Spouse name: Not on file    Number of children: Not on file    Years of education: Not on file    Highest education level: Not on file   Occupational History    Not on file   Tobacco Use    Smoking status: Never    Smokeless tobacco: Never   Substance and Sexual Activity    Alcohol use: Yes     Comment: varies    Drug use: Never    Sexual activity: Not on file   Other Topics Concern    Not on file   Social History Narrative    Not on file     Social Determinants of Health     Financial Resource Strain: Low Risk  (1/17/2024)    [facility]    Overall Financial Resource Strain (CARDIA)     Difficulty of Paying Living Expenses: Not hard at all   Food Insecurity: No Food Insecurity (1/17/2024)    Received from [facility]    Hunger Vital Sign     Worried  About Running Out of Food in the Last Year: Never true     Ran Out of Food in the Last Year: Never true   Transportation Needs: No Transportation Needs (1/17/2024)    Received from [facility]    PRAPARE - Transportation     Lack of Transportation (Medical): No     Lack of Transportation (Non-Medical): No   Physical Activity: Not on file   Stress: Not on file   Social Connections: Socially Integrated (12/11/2023)    Received from [facility]    Social Connections     Frequency of Communication with Friends and Family: 0   Interpersonal Safety: Not At Risk (1/17/2024)    Received from [facility] [employer]     Do you feel UNSAFE in any of your personal relationships with your family members or any other acquaintances?: No   Housing Stability: Low Risk  (1/17/2024)    Received from [facility] [employer] Domain     Retired - What is your living situation today? : I have a steady place to live       Family History:  No family history on file.    Review of Systems:  A complete review of systems reviewed by me is negative with the exeption of what has been mentioned in the history of present illness.  In the last TWO WEEKS have you experienced any of the following symptoms?  Fevers: No  Night Sweats: No  Weight Gain: No  Pain at Night: No  Double Vision: No  Changes in Vision: No  Difficulty Breathing through Nose: Yes  Sore Throat in Morning: No  Dry Mouth in the Morning: No  Shortness of Breath Lying Flat: No  Shortness of Breath With Activity: Yes  Awakening with Shortness of Breath: No  Increased Cough: Yes  Heart Racing at Night: No  Swelling in Feet or Legs: No  Diarrhea at Night: No  Heartburn at Night: No  Urinating More than Once at Night: No  Losing Control of Urine at Night: No  Joint Pains at Night: No  Headaches in Morning: No  Weakness in Arms or Legs:  "No  Depressed Mood: No  Anxiety: No     Physical Examination:  Vitals: /71   Pulse 90   Ht 1.702 m (5' 7.01\")   Wt 60.7 kg (133 lb 12.8 oz)   SpO2 100%   BMI 20.95 kg/m    BMI= Body mass index is 20.95 kg/m .    Neck Cir (cm): 31 cm    General: Pleasant. Cooperative. In no apparent distress.  Pulmonary: Able to speak in full sentences easily with normal respiratory effort. No cough or wheeze.   Neurologic: Alert, oriented x3.  Psychiatric: Mood euthymic. Affect congruent with full range and intensity.            Data: All pertinent previous laboratory data reviewed       Michelle Cotton MD 6/10/2024           "

## 2024-06-27 ENCOUNTER — ANCILLARY PROCEDURE (OUTPATIENT)
Dept: NEUROLOGY | Facility: CLINIC | Age: 64
End: 2024-06-27
Attending: PSYCHIATRY & NEUROLOGY
Payer: COMMERCIAL

## 2024-06-27 DIAGNOSIS — R44.2 PHANTOSMIA: ICD-10-CM

## 2024-06-27 DIAGNOSIS — R41.89 COGNITIVE DECLINE: ICD-10-CM

## 2024-07-02 NOTE — PROGRESS NOTES
CC: Follow Up    Prior History:  Marialuisa Gongora is a 64 year old female who presents today for a follow-up visit. Please see my initial encounter dated 5/10/24 for a full review of the patient's history.     Marialuisa has a past medical history of breast cancer in 2012 (s/p bilateral mastectomy, no chemotherapy or radiation therapy, status post breast implants), history of anorexia nervosa, positive Lyme serology (2020, status post doxycycline treatment), spinal stenosis of the lumbar region (s/p lumbar spinal fusion and neurostimulator for chronic pain), B12 injections (due to low B12 despite oral supplementation, which started January 2024), atrial fibrillation on aspirin, hypertension, hypothyroidism, displacement of cervical intervertebral disc, status post left hip replacement, and Dupixent therapy for inflammatory skin condition.     At 4am in February, she went out the garage door but has no recollection of this. It tripped their home alarm system. Ozzie was out of town got a call from their home alarm company stating that the alarm had been tripped. He could not get a hold of Marialuisa and called a wellness check. The police came and found Marialuisa looking healthy and well by all accounts. Of note, she was not on any sedating medication at this time and she denies any sleeping pills, CBD, marijuana products, or any other sleep medications. Ozzie finally got in touch with her at about 7am and explained that she had tripped the alarm, but she was unable to provide any clarity on what had happened. She remembers now that the police were there but in the moment when her  first asked her she was uncertain. Later that morning at around 10:30AM she was standing at the top of the stairs getting ready to work out and ended up blacking out and fell down 16 stairs, resulting in upper cervical vertebral injury. She denies any loss of consciousness around the time of that fall.      Two days after she returned from  the hospital she was standing at the sink and blacked out again and fell. Her  reports dizzy spells predominantly from sitting the standing. They deny ever being told to have orthostasis, and Marialuisa underwent an echo and Holter that were unexplanatory.     She also has more persistent cognitive trouble: Marialuisa and Ozzie argue about whether or not they have discussed things. Their daughter Teri has also mentioned her repeating conversations intermittently. This is usually things that were discussed within one to seven days.      She has experienced rather severe constipation but no urinary frequency/retention. She does struggle with chronic dry eyes.      She does not have an issue with her sense of smell but does note that she often will smell cigarette smoke (several times per week) when others do not notice this. She has never had a seizure to her knowledge.     She sleeps ok at night but Ozzie has noted that she often talks in her sleep and moves her hands. There is no obviously violent behavior.     She scored 27/30 on MMSE during our initial visit, with points off for pentagons, calendar date and city (both near [facility]). We ordered an MRI Brain, EEG, and referreal to Dr. Reid in sleep medicine for further workup.     Updates From This Visit:   Today, the patient presents with Ozzie, her , who is an independent historian and provides collateral information included below.     No loss of consciousness, repetitive movements, stiffness or twitching, sudden weakness, jamais vu, sudden sense of panic, staring off into space, aura, temporary confusion, or loss of a chunk of time.     She has had a few instances of carmencita vu (maybe once every few months). She will go somewhere she knows she has not been but feel that she has been there before. She regularly smells cigarette smoke when it is not there. No other phantom smells. She feels like her sense of smell is generally fine outside of these  instances where she smells cigarettes.     She can have shaking when she goes from sitting to standing and her BP drops. During these episodes her vision may become dark or blurry.     She does struggle with constipation to the point where she will take mineral oil, senna, and suppositories and this only works sometimes. She has struggled with this for years. No urinary issues. She has dry eyes and dry mouth and has dealt with this for some time now.     Review of Psychotropic and High-Risk Medications:  Amytriptyline  Clonazepam  Gabapentin    Current Outpatient Medications   Medication Sig Dispense Refill    amitriptyline (ELAVIL) 10 MG tablet For allergy testing 1 tablet 0    amitriptyline (ELAVIL) 100 MG tablet       Apoaequorin (PREVAGEN) 10 MG CAPS Take 1 capsule by mouth daily      clonazePAM (KLONOPIN) 0.5 MG tablet Take one-two tabs po at bedtime the night of your sleep study when you are ready to fall asleep 2 tablet 0    Desoximetasone (TOPICORT LP) 0.05 % OINT Externally apply topically 2 times daily as needed 60 g 3    Dupilumab (DUPIXENT) 300 MG/2ML syringe Inject 2 mLs (300 mg) Subcutaneous every 14 days 4 mL 2    DUPIXENT 300 MG/2ML syringe Inject 300 mg Subcutaneous every 14 days      gabapentin (NEURONTIN) 100 MG capsule For allergy testing 1 capsule 0    magnesium sulfate 500 mg/mL SOLN Take 1 tablet by mouth daily      progesterone (PROMETRIUM) 200 MG capsule For allergy testing 1 capsule 0    progesterone (PROMETRIUM) 200 MG capsule Take 200 mg by mouth daily      thyroid (ARMOUR) 15 MG tablet Take 15 mg by mouth daily       No current facility-administered medications for this visit.         Exam:  Neuro: Alert, awake, fluent and logical speech, on-topic conversation. Normal gait. No parkinsonism.    I reviewed new external records since their last visit, which show Westley took home orthostatic vitals and reported the following measurements:      05/13 left  124/27 P- 69  84/64 P -110  115/84 P-  110     (R) 106/66 P-60  95/67 P-105  110/85 P- 110     05/14  (L) 135/82 P-74  90/68 P-111  114/75 P-120     (R) 122/73 P- 73  90/71 P-120  87/63  P-120     05/15   (L) 129/85 P- 70  78/61 P-76  83/67 P-120     (R) 121/768 P-68  94/72 P-120  88/65 P- 110     05/16   (L) 122/66  P-68  92/76 P - 110  114/68 P-115     (R) 118/64 P-74  92/66 P-122  86/62 P 12O     05/17   (L) 118/80 P-72  98/71 P-112  1OO/70 P-113     (R) 117/70 P-70  96/79 P-110  98/73 P- 113     05/18   (L) 124/80 P-70  89/73 P- 110  113/76 P- 114     (R) 129/79 P- 70  97/78 P- 110  84/62 P-120    She was seen by Dr. Reid on 6/10/24 who ordered a PSG and started her on clonazepam.     I reviewed new lab results since their last visit, which include: no new lab results.     I reviewed new imaging since their last visit, including:   TEST LOCATION RESULT DATE    EEG [facility] EEG    IMPRESSION: Normal in wakefulness and brief poorly sustained sleep. No epileptiform discharges or seizures. It should be noted that a normal EEG does not exclude a diagnosis of seizure disorder and clinical correlation is needed.    6/27/24   MRI Brain  [facility]  Nonspecific T2/FLAIR hyperintensities.    5/22/24     I personally reviewed the brain MRI. There is mild non-specific T2 signal in the subcortical white matter. There is questionable atrophy of the L > R posterolateral parietal cortices. No abnormal SWI, DWI, or T1 signal. No post-contrast signal available for review.    Assessment and Care Plan:   #Cognitive concerns  #4AM confusional spell x 1  #Phantom cigarette smells  #Orthostasis  #Possible dream enactment behavior  #Dry eyes, dry mouth    Unclear what the cause of her mild cognitive symptoms are. The confusional spell at 4AM where she tripped the home alarm and had minimal memory of speaking to the police is without a readily available explanation. A parasomnia is possible but she lacks any clear cut sleep walking events in her  history. Her phantom smells make transient epileptic amnesia a possibility (a condition that occurs predominantly upon waking), but there are no other solid symptom reports to suggest seizure, this was a one-time event, and her routine EEG was normal (note that normal interictal EEGs are present in >60% of patients with confirmed transient epileptic amnesia). A cognitive fluctuation as seen in Lewy body disease is another consideration.    The overarching picture is dysautonomia with orthostasis, constipation, and dry eyes, alongside more subtle cognitive concerns with possible dream enactment behavior. The PSG will be useful to evaluate for REM sleep behavior disorder, which if confirmed could provide a unifying diagnosis. If this is normal, other diagnostic approaches, such as prolonged EEG, neuropsychological testing, and expanded lab work-up may be required.    -compression stockings, abdominal binder, electrolytes, increasing sodium intake, and fluid intake  -donepezil 5 mg daily  -cardiology appointment for any other medications to try for orthostasis if above don't work  -continue with PSG with Dr. Reid  -ophthalmology referral for unexplained squiggly lines in right eye  -return visit with me in 3 months    Today, I spent 72 minutes reviewing the chart, personally assessing objective testing, direct patient care, completing documentation and billing.    The longitudinal plan of care for the diagnosis(es)/condition(s) as documented were addressed during this visit. Due to the added complexity in care, I will continue to support Marialuisa in the subsequent management and with ongoing continuity of care.

## 2024-07-03 ENCOUNTER — OFFICE VISIT (OUTPATIENT)
Dept: NEUROLOGY | Facility: CLINIC | Age: 64
End: 2024-07-03
Payer: COMMERCIAL

## 2024-07-03 VITALS
TEMPERATURE: 98.9 F | SYSTOLIC BLOOD PRESSURE: 119 MMHG | HEIGHT: 67 IN | DIASTOLIC BLOOD PRESSURE: 75 MMHG | OXYGEN SATURATION: 97 % | BODY MASS INDEX: 21.25 KG/M2 | WEIGHT: 135.4 LBS | HEART RATE: 98 BPM

## 2024-07-03 DIAGNOSIS — R41.89 COGNITIVE DECLINE: Primary | ICD-10-CM

## 2024-07-03 DIAGNOSIS — H53.9 MONOCULAR VISUAL DISTURBANCE: ICD-10-CM

## 2024-07-03 RX ORDER — DONEPEZIL HYDROCHLORIDE 5 MG/1
5 TABLET, FILM COATED ORAL AT BEDTIME
Qty: 60 TABLET | Refills: 3 | Status: SHIPPED | OUTPATIENT
Start: 2024-07-03

## 2024-07-03 NOTE — PATIENT INSTRUCTIONS
-compression stockings, abdominal binder, electrolytes, increasing sodium intake, and fluid intake  -donepezil 5 mg daily  -cardiology appointment for any other medications to try for orthostasis if above don't work  -continue with PSG with Dr. Reid  -ophthalmology referral for unexplained squiggly lines in right eye

## 2024-07-03 NOTE — LETTER
7/3/2024       RE: Marialuisa Gongora  [address]  [place] [address]       Dear Colleague,      Thank you for referring your patient, Marialuisa Gongora, to the  [facility] at [facility]. Please see a copy of my visit note below.    CC: Follow Up    Prior History:  Marialuisa Gongora is a 64 year old female who presents today for a follow-up visit. Please see my initial encounter dated 5/10/24 for a full review of the patient's history.     Marialuisa has a past medical history of breast cancer in 2012 (s/p bilateral mastectomy, no chemotherapy or radiation therapy, status post breast implants), history of anorexia nervosa, positive Lyme serology (2020, status post doxycycline treatment), spinal stenosis of the lumbar region (s/p lumbar spinal fusion and neurostimulator for chronic pain), B12 injections (due to low B12 despite oral supplementation, which started January 2024), atrial fibrillation on aspirin, hypertension, hypothyroidism, displacement of cervical intervertebral disc, status post left hip replacement, and Dupixent therapy for inflammatory skin condition.     At 4am in February, she went out the garage door but has no recollection of this. It tripped their home alarm system. Ozzie was out of town got a call from their home alarm company stating that the alarm had been tripped. He could not get a hold of Marialuisa and called a wellness check. The police came and found Marialuisa looking healthy and well by all accounts. Of note, she was not on any sedating medication at this time and she denies any sleeping pills, CBD, marijuana products, or any other sleep medications. Ozzie finally got in touch with her at about 7am and explained that she had tripped the alarm, but she was unable to provide any clarity on what had happened. She remembers now that the police were there but in the moment when her  first asked her she was uncertain.  Later that morning at around 10:30AM she was standing at the top of the stairs getting ready to work out and ended up blacking out and fell down 16 stairs, resulting in upper cervical vertebral injury. She denies any loss of consciousness around the time of that fall.      Two days after she returned from the hospital she was standing at the sink and blacked out again and fell. Her  reports dizzy spells predominantly from sitting the standing. They deny ever being told to have orthostasis, and Marialuisa underwent an echo and Holter that were unexplanatory.     She also has more persistent cognitive trouble: Marialuisa and Ozzie argue about whether or not they have discussed things. Their daughter Teri has also mentioned her repeating conversations intermittently. This is usually things that were discussed within one to seven days.      She has experienced rather severe constipation but no urinary frequency/retention. She does struggle with chronic dry eyes.      She does not have an issue with her sense of smell but does note that she often will smell cigarette smoke (several times per week) when others do not notice this. She has never had a seizure to her knowledge.     She sleeps ok at night but Ozzie has noted that she often talks in her sleep and moves her hands. There is no obviously violent behavior.     She scored 27/30 on MMSE during our initial visit, with points off for pentagons, calendar date and city (both near [facility]). We ordered an MRI Brain, EEG, and referreal to Dr. Reid in sleep medicine for further workup.     Updates From This Visit:   Today, the patient presents with Ozzie, her , who is an independent historian and provides collateral information included below.     No loss of consciousness, repetitive movements, stiffness or twitching, sudden weakness, jamais vu, sudden sense of panic, staring off into space, aura, temporary confusion, or loss of a chunk of time.     She has had  a few instances of carmencita vu (maybe once every few months). She will go somewhere she knows she has not been but feel that she has been there before. She regularly smells cigarette smoke when it is not there. No other phantom smells. She feels like her sense of smell is generally fine outside of these instances where she smells cigarettes.     She can have shaking when she goes from sitting to standing and her BP drops. During these episodes her vision may become dark or blurry.     She does struggle with constipation to the point where she will take mineral oil, senna, and suppositories and this only works sometimes. She has struggled with this for years. No urinary issues. She has dry eyes and dry mouth and has dealt with this for some time now.     Review of Psychotropic and High-Risk Medications:  Amytriptyline  Clonazepam  Gabapentin    Current Outpatient Medications   Medication Sig Dispense Refill    amitriptyline (ELAVIL) 10 MG tablet For allergy testing 1 tablet 0    amitriptyline (ELAVIL) 100 MG tablet       Apoaequorin (PREVAGEN) 10 MG CAPS Take 1 capsule by mouth daily      clonazePAM (KLONOPIN) 0.5 MG tablet Take one-two tabs po at bedtime the night of your sleep study when you are ready to fall asleep 2 tablet 0    Desoximetasone (TOPICORT LP) 0.05 % OINT Externally apply topically 2 times daily as needed 60 g 3    Dupilumab (DUPIXENT) 300 MG/2ML syringe Inject 2 mLs (300 mg) Subcutaneous every 14 days 4 mL 2    DUPIXENT 300 MG/2ML syringe Inject 300 mg Subcutaneous every 14 days      gabapentin (NEURONTIN) 100 MG capsule For allergy testing 1 capsule 0    magnesium sulfate 500 mg/mL SOLN Take 1 tablet by mouth daily      progesterone (PROMETRIUM) 200 MG capsule For allergy testing 1 capsule 0    progesterone (PROMETRIUM) 200 MG capsule Take 200 mg by mouth daily      thyroid (ARMOUR) 15 MG tablet Take 15 mg by mouth daily       No current facility-administered medications for this visit.      Exam:  Neuro: Alert, awake, fluent and logical speech, on-topic conversation. Normal gait. No parkinsonism.    I reviewed new external records since their last visit, which show Westley took home orthostatic vitals and reported the following measurements:      05/13 left  124/27 P- 69  84/64 P -110  115/84 P- 110     (R) 106/66 P-60  95/67 P-105  110/85 P- 110     05/14  (L) 135/82 P-74  90/68 P-111  114/75 P-120     (R) 122/73 P- 73  90/71 P-120  87/63  P-120     05/15   (L) 129/85 P- 70  78/61 P-76  83/67 P-120     (R) 121/768 P-68  94/72 P-120  88/65 P- 110     05/16   (L) 122/66  P-68  92/76 P - 110  114/68 P-115     (R) 118/64 P-74  92/66 P-122  86/62 P 12O     05/17   (L) 118/80 P-72  98/71 P-112  1OO/70 P-113     (R) 117/70 P-70  96/79 P-110  98/73 P- 113     05/18   (L) 124/80 P-70  89/73 P- 110  113/76 P- 114     (R) 129/79 P- 70  97/78 P- 110  84/62 P-120    She was seen by Dr. Reid on 6/10/24 who ordered a PSG and started her on clonazepam.     I reviewed new lab results since their last visit, which include: no new lab results.     I reviewed new imaging since their last visit, including:   TEST LOCATION RESULT DATE    EEG [facility] EEG    IMPRESSION: Normal in wakefulness and brief poorly sustained sleep. No epileptiform discharges or seizures. It should be noted that a normal EEG does not exclude a diagnosis of seizure disorder and clinical correlation is needed.    6/27/24   MRI Brain  [facility]  Nonspecific T2/FLAIR hyperintensities.    5/22/24     I personally reviewed the brain MRI. There is mild non-specific T2 signal in the subcortical white matter. There is questionable atrophy of the L > R posterolateral parietal cortices. No abnormal SWI, DWI, or T1 signal. No post-contrast signal available for review.    Assessment and Care Plan:   #Cognitive concerns  #4AM confusional spell x 1  #Phantom cigarette smells  #Orthostasis  #Possible dream enactment behavior  #Dry eyes, dry  mouth    Unclear what the cause of her mild cognitive symptoms are. The confusional spell at 4AM where she tripped the home alarm and had minimal memory of speaking to the police is without a readily available explanation. A parasomnia is possible but she lacks any clear cut sleep walking events in her history. Her phantom smells make temporal lobe epilepsy a possibility, and perhaps she had post-ictal confusion, but there are no other solid symptom reports to suggest seizure, and her routine EEG was normal. A cognitive fluctuation as seen in Lewy body disease is possible, too.    The overarching picture is dysautonomia with orthostasis, constipation, and dry eyes, alongside more subtle cognitive concerns with possible dream enactment behavior. The PSG will be useful to evaluate for REM sleep behavior disorder, which if confirmed could provide a unifying diagnosis. If this is normal, other diagnostic approaches, such as prolonged EEG, neuropsychological testing, and expanded lab work-up may be required.    -compression stockings, abdominal binder, electrolytes, increasing sodium intake, and fluid intake  -donepezil 5 mg daily  -cardiology appointment for any other medications to try for orthostasis if above don't work  -continue with PSG with Dr. Reid  -ophthalmology referral for unexplained squiggly lines in right eye  -return visit with me in 3 months    Today, I spent 72 minutes reviewing the chart, personally assessing objective testing, direct patient care, completing documentation and billing.    The longitudinal plan of care for the diagnosis(es)/condition(s) as documented were addressed during this visit. Due to the added complexity in care, I will continue to support Marialuisa in the subsequent management and with ongoing continuity of care.      Again, thank you for allowing me to participate in the care of your patient.      Sincerely,    Zeyad George MD

## 2024-07-16 ENCOUNTER — TRANSFERRED RECORDS (OUTPATIENT)
Dept: HEALTH INFORMATION MANAGEMENT | Facility: CLINIC | Age: 64
End: 2024-07-16
Payer: COMMERCIAL

## 2024-07-25 ENCOUNTER — TELEPHONE (OUTPATIENT)
Dept: NEUROLOGY | Facility: CLINIC | Age: 64
End: 2024-07-25

## 2024-07-25 NOTE — TELEPHONE ENCOUNTER
[facility] Medical Records request sent to [facility]. Original placed in the 30 day folder.

## 2024-08-10 ENCOUNTER — HEALTH MAINTENANCE LETTER (OUTPATIENT)
Age: 64
End: 2024-08-10

## 2024-08-12 ENCOUNTER — TRANSFERRED RECORDS (OUTPATIENT)
Dept: HEALTH INFORMATION MANAGEMENT | Facility: CLINIC | Age: 64
End: 2024-08-12
Payer: COMMERCIAL

## 2024-10-17 ENCOUNTER — TELEPHONE (OUTPATIENT)
Dept: SCHEDULING | Facility: CLINIC | Age: 64
End: 2024-10-17
Payer: COMMERCIAL

## 2024-10-17 NOTE — TELEPHONE ENCOUNTER
Reason for Call:  Appointment Request    Patient requesting this type of appt:  Return Video Visit RBD    Requested provider:  [place],     Reason patient unable to be scheduled: Needs to be scheduled by clinic    When does patient want to be seen/preferred time:  After Sleep study which is scheduled for 12/3/2024    Comments: Please contact pt heidi godfrey with Dr. Reid, is on RBD schedule    Could we send this information to you in [facility] or would you prefer to receive a phone call?:   Patient would prefer a phone call   Okay to leave a detailed message?: Yes at Cell number on file:    Telephone Information:   Mobile [phone]       Call taken on 10/17/2024 at 11:01 AM by Meliza Linda

## 2024-11-28 ASSESSMENT — SLEEP AND FATIGUE QUESTIONNAIRES
HOW LIKELY ARE YOU TO NOD OFF OR FALL ASLEEP WHILE WATCHING TV: WOULD NEVER DOZE
HOW LIKELY ARE YOU TO NOD OFF OR FALL ASLEEP WHILE SITTING AND TALKING TO SOMEONE: WOULD NEVER DOZE
HOW LIKELY ARE YOU TO NOD OFF OR FALL ASLEEP WHILE SITTING INACTIVE IN A PUBLIC PLACE: WOULD NEVER DOZE
HOW LIKELY ARE YOU TO NOD OFF OR FALL ASLEEP WHEN YOU ARE A PASSENGER IN A CAR FOR AN HOUR WITHOUT A BREAK: WOULD NEVER DOZE
HOW LIKELY ARE YOU TO NOD OFF OR FALL ASLEEP WHILE SITTING QUIETLY AFTER LUNCH WITHOUT ALCOHOL: WOULD NEVER DOZE
HOW LIKELY ARE YOU TO NOD OFF OR FALL ASLEEP WHILE LYING DOWN TO REST IN THE AFTERNOON WHEN CIRCUMSTANCES PERMIT: SLIGHT CHANCE OF DOZING
HOW LIKELY ARE YOU TO NOD OFF OR FALL ASLEEP IN A CAR, WHILE STOPPED FOR A FEW MINUTES IN TRAFFIC: WOULD NEVER DOZE
HOW LIKELY ARE YOU TO NOD OFF OR FALL ASLEEP WHILE SITTING AND READING: WOULD NEVER DOZE

## 2024-12-03 ENCOUNTER — THERAPY VISIT (OUTPATIENT)
Dept: SLEEP MEDICINE | Facility: CLINIC | Age: 64
End: 2024-12-03
Attending: PSYCHIATRY & NEUROLOGY
Payer: COMMERCIAL

## 2024-12-03 DIAGNOSIS — G47.52 RBD (REM BEHAVIORAL DISORDER): ICD-10-CM

## 2024-12-04 NOTE — PATIENT INSTRUCTIONS
[facility]    1. Your sleep study will be reviewed by a sleep physician.     2. Please follow up in the sleep clinic as scheduled, or, make an appointment with your sleep provider to be seen in 3 months.    3. If you have any questions or problems with your treatment plan, please contact your sleep clinic provider at [phone] to further manage your condition.    4. Please review your attached medication list, and, at your follow-up appointment advise your sleep clinic provider about any changes.    5. Go to [email or web address] for more information about your sleep problems.    Andrae RODRIGUEZ RRT, RPSGT  December 4, 2024

## 2024-12-04 NOTE — PROGRESS NOTES
Diagnostic PSG completed per provider order.  Patient did not meet criteria for PAP therapy.    A four limb montage was applied per Dr. Reid's order.

## 2024-12-17 NOTE — PROGRESS NOTES
HPI:  Marialuisa Gongora is a 64 year old female who presents today for a follow-up visit. Please see my initial encounter dated 5/10/24 for a full review of the patient's history.      Marialuisa has a past medical history of breast cancer in 2012 (s/p bilateral mastectomy, no chemotherapy or radiation therapy, status post breast implants), history of anorexia nervosa, positive Lyme serology (2020, status post doxycycline treatment), spinal stenosis of the lumbar region (s/p lumbar spinal fusion and neurostimulator for chronic pain), B12 injections (due to low B12 despite oral supplementation, which started January 2024), atrial fibrillation on aspirin, hypertension, hypothyroidism, displacement of cervical intervertebral disc, status post left hip replacement, inflammatory skin condition.      At 4am in February 2024, she went out the garage door but has no recollection of this. It tripped their home alarm system. Ozzie was out of town got a call from their home alarm company stating that the alarm had been tripped. He could not get a hold of Marialuisa and called a wellness check. The police came and found Marialuisa looking healthy and well by all accounts. Of note, she was not on any sedating medication at this time and she denies any sleeping pills, CBD, marijuana products, or any other sleep medications. Ozzie finally got in touch with her at about 7am and explained that she had tripped the alarm, but she was unable to provide any clarity on what had happened. She remembers now that the police were there but in the moment when her  first asked her she was uncertain. Later that morning at around 10:30AM she was standing at the top of the stairs getting ready to work out and ended up blacking out and fell down 16 stairs, resulting in upper cervical vertebral injury. She denies any loss of consciousness around the time of that fall.      Two days after she returned from the hospital she was standing at the sink and  blacked out again and fell. Her  reports dizzy spells predominantly from sitting the standing. Marialuisa underwent an echo and Holter that were unexplanatory but orthostatic vital signs were positive.     She also has more persistent cognitive trouble: Marialuisa and Ozzie argue about whether or not they have discussed things. Their daughter Teri has also mentioned her repeating conversations intermittently. This is usually things that were discussed within one to seven days.      She has experienced rather severe constipation but no urinary frequency/retention. She struggles with chronic dry eyes.      She does not have an issue with her baseline sense of smell but she had noted occasional cigarette smoke smell (several times per week) when others do not notice this. She has never had a motor seizure to her knowledge.      She sleeps ok at night but Ozzie has noted that she often talks in her sleep and moves her hands. There is no obviously violent behavior.       She scored 27/30 on MMSE during our initial visit, with points off for pentagons, calendar date and city (both near [facility]). Elementary neurological examination was unremarkable and did not show signs of parkinsonisn. A routine 30 minute EEG was normal. Brain MRI showed mild non-specific T2 signal in the subcortical white matter. There is questionable atrophy of the L > R posterolateral parietal cortices. No abnormal SWI, DWI, or T1 signal. No post-contrast signal available for review.     At the end of our last visit, one of the leading differential diagnoses was Lewy body disease as the cause of her symptoms, which is compatible with her dysautonomia with orthostasis, constipation, and dry eyes, alongside persistent yet subtle cognitive concerns and possible dream enactment behavior.     At the end of our last visit, we ordered:  -compression stockings, abdominal binder, electrolytes, increasing sodium intake, and fluid intake  -donepezil 5 mg  daily  -cardiology appointment for any other medications to try for orthostasis if above don't work  -continue with PSG with Dr. Reid  -ophthalmology referral for unexplained squiggly lines in right eye  -return visit with me in 3 months     Today, the patient presents alone.    She reports improvement in her orthostasis with increased salt intake. She uses compression stockings, too, but does not use an abdominal binder.  She denies any worsening of her cognition or new confusional events.  She no longer has the phantom cigarettes spells, at least for several months.    Review of psychotropic medications:  Amytriptyline    Exam:  Difficulty copying the 3D cube. Pentagons are easy for her copy; 5/5 word recollection at 3 minutes normal. Normal gait. Normal speech including logical and on-topic questions.     Outside Record Review:  I personally discussed her PSG with Dr. Reid, there is no convincing signs of RBD or RSWA on her sleep study.    A/P:   #Confusional event, uncertain cause  #Prior orthostasis, resolved with liberalized salt intake and compression stockings  #Prior phantom smells, spontaneously resolved    I don't have a good explanation for her confusional episode, orthostasis, and phantom smells, which are now all resolved. Transient epileptic amnesia could potentially explain her confusional event and phantom smells. We discussed a prolonged EEG  to further evaluate but this was declined after discussing the relatively low sensitivity of this test to medial temporal lobe epileptiform abnormalities. We previously discussed Lewy body disease as a unifying diagnosis given some soft signs of sleep vocalization and hand movements, but the polysomnogram did not show evidence for REM sleep without atonia to support this, and her lack of convincing persistent symptoms of cognitive impairment argue against a neurodegenerative process. A paraneoplastic etiology given her history of breast cancer was also  considered but the self-limited episode would be highly atypical.     We decided to pursue watchful waiting and will see Marialuisa back in clinic in 6 months.  I also advised her to stop using Prevagen.    Today, I spent 47 minutes reviewing the chart, personally assessing objective testing, direct patient care, completing documentation and billing.    The longitudinal plan of care for the diagnosis(es)/condition(s) as documented were addressed during this visit. Due to the added complexity in care, I will continue to support Marialuisa in the subsequent management and with ongoing continuity of care.

## 2024-12-18 ENCOUNTER — OFFICE VISIT (OUTPATIENT)
Dept: NEUROLOGY | Facility: CLINIC | Age: 64
End: 2024-12-18
Payer: COMMERCIAL

## 2024-12-18 VITALS
HEART RATE: 79 BPM | HEIGHT: 67 IN | TEMPERATURE: 98.7 F | WEIGHT: 137.6 LBS | DIASTOLIC BLOOD PRESSURE: 86 MMHG | SYSTOLIC BLOOD PRESSURE: 141 MMHG | BODY MASS INDEX: 21.6 KG/M2 | OXYGEN SATURATION: 96 %

## 2024-12-18 DIAGNOSIS — R41.89 SPELL OF ALTERED COGNITION: Primary | ICD-10-CM

## 2024-12-18 LAB — SLPCOMP: NORMAL

## 2024-12-18 NOTE — LETTER
12/18/2024       RE: Marialuisa Gongora  [address]  [place] [address]     Dear Colleague,    Thank you for referring your patient, Marialuisa Gongora, to the  [facility] at [facility]. Please see a copy of my visit note below.    HPI:  Marialuisa Gongora is a 64 year old female who presents today for a follow-up visit. Please see my initial encounter dated 5/10/24 for a full review of the patient's history.      Marialuisa has a past medical history of breast cancer in 2012 (s/p bilateral mastectomy, no chemotherapy or radiation therapy, status post breast implants), history of anorexia nervosa, positive Lyme serology (2020, status post doxycycline treatment), spinal stenosis of the lumbar region (s/p lumbar spinal fusion and neurostimulator for chronic pain), B12 injections (due to low B12 despite oral supplementation, which started January 2024), atrial fibrillation on aspirin, hypertension, hypothyroidism, displacement of cervical intervertebral disc, status post left hip replacement, inflammatory skin condition.      At 4am in February 2024, she went out the garage door but has no recollection of this. It tripped their home alarm system. Ozzie was out of town got a call from their home alarm company stating that the alarm had been tripped. He could not get a hold of Marialuisa and called a wellness check. The police came and found Marialuisa looking healthy and well by all accounts. Of note, she was not on any sedating medication at this time and she denies any sleeping pills, CBD, marijuana products, or any other sleep medications. Ozzie finally got in touch with her at about 7am and explained that she had tripped the alarm, but she was unable to provide any clarity on what had happened. She remembers now that the police were there but in the moment when her  first asked her she was uncertain. Later that morning at around 10:30AM she was  standing at the top of the stairs getting ready to work out and ended up blacking out and fell down 16 stairs, resulting in upper cervical vertebral injury. She denies any loss of consciousness around the time of that fall.      Two days after she returned from the hospital she was standing at the sink and blacked out again and fell. Her  reports dizzy spells predominantly from sitting the standing. Marialuisa underwent an echo and Holter that were unexplanatory but orthostatic vital signs were positive.     She also has more persistent cognitive trouble: Marialuisa and Ozzie argue about whether or not they have discussed things. Their daughter Teri has also mentioned her repeating conversations intermittently. This is usually things that were discussed within one to seven days.      She has experienced rather severe constipation but no urinary frequency/retention. She struggles with chronic dry eyes.      She does not have an issue with her baseline sense of smell but she had noted occasional cigarette smoke smell (several times per week) when others do not notice this. She has never had a motor seizure to her knowledge.      She sleeps ok at night but Ozzie has noted that she often talks in her sleep and moves her hands. There is no obviously violent behavior.       She scored 27/30 on MMSE during our initial visit, with points off for pentagons, calendar date and city (both near [facility]). Elementary neurological examination was unremarkable and did not show signs of parkinsonisn. A routine 30 minute EEG was normal. Brain MRI showed mild non-specific T2 signal in the subcortical white matter. There is questionable atrophy of the L > R posterolateral parietal cortices. No abnormal SWI, DWI, or T1 signal. No post-contrast signal available for review.     At the end of our last visit, one of the leading differential diagnoses was Lewy body disease as the cause of her symptoms, which is compatible with her  dysautonomia with orthostasis, constipation, and dry eyes, alongside persistent yet subtle cognitive concerns and possible dream enactment behavior.     At the end of our last visit, we ordered:  -compression stockings, abdominal binder, electrolytes, increasing sodium intake, and fluid intake  -donepezil 5 mg daily  -cardiology appointment for any other medications to try for orthostasis if above don't work  -continue with PSG with Dr. Reid  -ophthalmology referral for unexplained squiggly lines in right eye  -return visit with me in 3 months     Today, the patient presents alone.    She reports improvement in her orthostasis with increased salt intake. She uses compression stockings, too, but does not use an abdominal binder.  She denies any worsening of her cognition or new confusional events.  She no longer has the phantom cigarettes spells, at least for several months.    Review of psychotropic medications:  Amytriptyline    Exam:  Difficulty copying the 3D cube. Pentagons are easy for her copy; 5/5 word recollection at 3 minutes normal. Normal gait. Normal speech including logical and on-topic questions.     Outside Record Review:  I personally discussed her PSG with Dr. Reid, there is no convincing signs of RBD or RSWA on her sleep study.    A/P:   #Confusional event, uncertain cause  #Prior orthostasis, resolved with liberalized salt intake and compression stockings  #Prior phantom smells, spontaneously resolved    I don't have a good explanation for her confusional episode, orthostasis, and phantom smells, which are now all resolved. Transient epileptic amnesia could potentially explain her confusional event and phantom smells. We discussed a prolonged EEG  to further evaluate but this was declined after discussing the relatively low sensitivity of this test to medial temporal lobe epileptiform abnormalities. We previously discussed Lewy body disease as a unifying diagnosis given some soft signs of  sleep vocalization and hand movements, but the polysomnogram did not show evidence for REM sleep without atonia to support this, and her lack of convincing persistent symptoms of cognitive impairment argue against a neurodegenerative process. A paraneoplastic etiology given her history of breast cancer was also considered but the self-limited episode would be highly atypical.     We decided to pursue watchful waiting and will see Marialuisa back in clinic in 6 months.  I also advised her to stop using Prevagen.    Today, I spent 47 minutes reviewing the chart, personally assessing objective testing, direct patient care, completing documentation and billing.    The longitudinal plan of care for the diagnosis(es)/condition(s) as documented were addressed during this visit. Due to the added complexity in care, I will continue to support Marialuisa in the subsequent management and with ongoing continuity of care.      Again, thank you for allowing me to participate in the care of your patient.      Sincerely,    Zeyad George MD

## 2025-01-20 ENCOUNTER — MYC MEDICAL ADVICE (OUTPATIENT)
Dept: NEUROLOGY | Facility: CLINIC | Age: 65
End: 2025-01-20

## 2025-01-20 DIAGNOSIS — R41.89 COGNITIVE DECLINE: ICD-10-CM

## 2025-01-21 RX ORDER — DONEPEZIL HYDROCHLORIDE 5 MG/1
5 TABLET, FILM COATED ORAL AT BEDTIME
Qty: 60 TABLET | Refills: 3 | Status: SHIPPED | OUTPATIENT
Start: 2025-01-21

## 2025-02-19 NOTE — PROGRESS NOTES
[facility] Dermatology Note - Established Patient Visit    Dermatology Problem List:  1. Dermatitis - allergic vs contact  - using Dupixent since June 2020  - Followed by Dr. Worley with allergy  - In the past used mupirocin, triamcinolone, Eucrisa, Tacrolimus, Pimecrolimus   - Patch testing doubtful reactions to cetrimonium chloride, methenamine (formaldehyde), formaldehyde 1%   2. Positive Lyme serology: now s/p doxycycline 100 mg BID x4 weeks (December 2020), no changes in rash  3. Punch biopsy right lateral neck 9/3/19 spongiotic dermatitis with occasional eosinophils (outside hospital)    Encounter Date: Jan 5, 2021    CC:   Chief Complaint   Patient presents with     Derm Problem     Marialuisa, is here for her atopic dermatitis. Rash has it's mind of it's own. Pretty bad on estuardo, Today has gotten bad as well per pt.      History of Present Illness:  Ms. Marialuisa Gongora is a  60 year old female who presents for further evaluation of facial rash.     She has been seen for this by multiple specialists over the past 2 years, including Dermatologists and Allergists. She reports that she returns today because she hopes to know what causes the rash and to find a cure for it.     Has had patch testing (see previous notes), punch biopsy in 9/2019 that showed spongiotic dermatitis. Reports that she has had improvement with systemic steroids in the past (dose-dependent), some improvement with Dupixent (mostly noticed worsening just prior to the next dose).    Unsuccessful treatments include PO benadryl, Eucrisa (worsened rash), tacrolimus, pimecrolimus, triamcinolone, mometasone, and most recently completed 4 weeks of PO doxycyline and compresses with medication compounded by pharmacy (see below).     Has photo-patch testing scheduled with Dr. Stallworth next week.    She has changed all her beauty/hygiene/laundry products per previous patch results w/o changes in symptoms.     She endorses  Writer called patient to schedule a consultation with Rola Ryan for the first available opening.    Appointment is scheduled for 6/6/2025 at 10am, and has been added to the wait list, per patient's request.    Routed as an FYI.    "episodes of neck and facial rash that begin with a feeling of burning \"from the inside out\". She starts with a few erythematous patches, which increase in number, extend, and eventually coalesce. The affected skin also becomes hot and painful, like a burn, but without tenderness. She notes it feels like menopause hot flashes she experienced in the past.   Episodes begin and subside spontaneously, lasting from 2-3 hours up to 24.  She has pictures in her cell phone that show the above sequence happening within approximately 2 hours.   Exercise and sun exposure do not cause flares/rash.   Episodes seem to occur more frequently at night, has not noticed changes with weather/location (also spends time in [place]).     She notes that her face had been doing well for the past 3-4 days, but developed the rash again today. She has noticed that whenever she gets an erythematous patch on the right side of her nose, she later develops the full rash. The nose patch has been present for 2 weeks.   Except for the past 3-4 days, she has been having the rash on a daily basis.   Denies rash elsewhere. No muscle weakness, joint stiffness/sweling, fatigue, weight loss, loss of appetite, fevers/chills, dysphagia.     She is also taking progesterone, quetiapine and amitriptyline for sleep, gabapentin for chronic back pain.     Past Medical History:   Patient Active Problem List   Diagnosis     Controlled substance agreement signed     Spinal stenosis of lumbar region     S/P lumbar spinal fusion     Past Medical History:   Diagnosis Date     Displacement of cervical intervertebral disc      Elevated BP without diagnosis of hypertension      Herpes labialis 04/01/2016     History of anorexia nervosa      History of bilateral mastectomy      Infiltrating ductal carcinoma of left breast (H) 2012     Lyme disease     2012     Pneumothorax, right 12/11/2016     Past Surgical History:   Procedure Laterality Date     BACK SURGERY      C4-5 " "fusion; L4-5 diskectomy/laminectomy x 2; L3-S1 fusion;     LAPAROTOMY EXPLORATORY      r oophorectomy     MASTECTOMY MODIFIED RADICAL Bilateral      Social History:  The patient works in [employer]. 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 The patient denies use of tanning beds. [place], admits to excessive sun exposure in her youth.    Family History:  No significant history     Medications:  Current Outpatient Medications   Medication Sig Dispense Refill     acyclovir (ZOVIRAX) 400 MG tablet Take 400 mg by mouth       [START ON 1/11/2021] amitriptyline (ELAVIL) 10 MG tablet For allergy testing 1 tablet 0     amitriptyline (ELAVIL) 100 MG tablet        Calcium-Magnesium-Vitamin D 400-166.7-133.3 MG-MG-UNIT TABS        celecoxib (CELEBREX) 200 MG capsule Take 1 capsule (200 mg) by mouth 2 times daily 60 capsule 1     COMPOUNDED NON-CONTROLLED SUBSTANCE (CMPD RX) - PHARMACY TO MIX COMPOUNDED MEDICATION Aqua d'Al[employer] use \"COPPER SULFATE 1%, ZINC SULFATE 1.5% AND CAMPHOR ANTISEPTIC(FCP-8700): dilute stock solution 1:10 in water, keep diluted solution in fridge and use 1-2x daily for compresses 500 mL 3     Desoximetasone (TOPICORT LP) 0.05 % OINT Externally apply topically 2 times daily as needed 60 g 3     Dupilumab (DUPIXENT) 300 MG/2ML syringe Inject 2 mLs (300 mg) Subcutaneous every 14 days for 10 doses 4 mL 2     Dupilumab (DUPIXENT) 300 MG/2ML syringe Inject 2 mLs (300 mg) Subcutaneous every 10 days for 8 doses 4 mL 2     Dupilumab (DUPIXENT) 300 MG/2ML syringe Inject 2 mLs (300 mg) Subcutaneous every 14 days 4 mL 2     DUPIXENT 300 MG/2ML syringe        fluocinolone (SYNALAR) 0.025 % ointment SEBLE AA BID       [START ON 1/11/2021] gabapentin (NEURONTIN) 100 MG capsule For allergy testing 1 capsule 0     gabapentin (NEURONTIN) 300 MG capsule TK ONE C PO  BID AND 2 CS PO ONCE D FOR TOTAL DOSE OF 4 CS DAILY       HYDROcodone-acetaminophen (NORCO) 5-325 MG tablet Take 1 tablet by " "mouth       mometasone (ELOCON) 0.1 % external cream Apply topically daily 45 g 1     pimecrolimus (ELIDEL) 1 % external cream Apply topically 2 times daily 60 g 3     [START ON 1/11/2021] progesterone (PROMETRIUM) 200 MG capsule For allergy testing 1 capsule 0     progesterone (PROMETRIUM) 200 MG capsule        QUEtiapine (SEROQUEL) 100 MG tablet TK SS TO ONE T PO D       [START ON 1/11/2021] QUEtiapine (SEROQUEL) 200 MG tablet For allergy testing 1 tablet 0     valACYclovir (VALTREX) 500 MG tablet Take 500 mg by mouth       EUCRISA 2 % ointment SEBLE EXT AA BID       tacrolimus (PROTOPIC) 0.1 % external ointment APPLY EXTERNALLY BID       triamcinolone (KENALOG) 0.1 % external cream        triamcinolone (KENALOG) 0.1 % external ointment SEBLE EXT TO ARMS BID FOR 14 DAYS          Allergies:  Allergies   Allergen Reactions     Codeine Itching     Baclofen Other (See Comments)     Minocycline      Other Environmental Allergy      Patch Test Results 12-10-19    Very Strong/Strong  None    Mild  None    Borderline (poessible irritation, may not be allergic)  Centrimonium chloride  Methenamine (formaldehyde)  Formaldehyde     Penicillins Hives     5-23 had hives as a childhood rxn - HAS HAD CSN in the past without problem  THROAT SWELLS AND HIVES       Sulfa Drugs Unknown       Review of Systems:  Constitutional: No recent fevers, chills, night sweats.  Skin: As above in HPI. Otherwise no additional skin rashes or changes.    Physical exam:  Vitals: There were no vitals taken for this visit.  Vitals and nursing notes reviewed  GEN: This is a well developed, well-nourished female in no acute distress, in a pleasant mood.    SKIN: Sun-exposed skin, which includes the head/face, neck, and distal arms and nails was examined.   - diffuse erythema on face, particularly forehead, cheeks and ears, without defined borders. There is a 1cm area of \"deeper\" erythema on right side of the nose, with hyperkeratosis. Multiple small " erythematous macules noted on anterior neck/upper chest.   - No other lesions of concern on areas examined.     Impression/Plan:    1. Eczematous dermatitis, face/neck. Prior bx in 2019 c/w spongiotic dermatitis. S/p patch testing at [facility] with  cetrimonium chloride, methenamine (formaldehyde), formaldehyde. On exam today and per history, appears most consistent with airborne or photo-allergic contact dermatitis, especially with prior supportive skin biopsy. Though, patient does have some more episodic, transient symptoms with differential including urticaria, less likely symptomatic flushing. Recommended photo and drug patch testing with Dr. Stallworth as planned. Did discuss possibility of oral immunosuppressives such as mycophenolate or methotrexate if patch test not revealing and/or rash continues despite attempts to avoid potential allergens.       Proceed with patch and prick testing as scheduled with Dr. Stallworth    F/u end of march 2021 (as she will be going to [place] until then)    Will trial Topicort 0.05% ointment BID PRN until follow-up    Consider workup for symptomatic flushing     Dr. Trevon Ayala staffed the patient.    Veena Power MD  Family Medicine Resident [facility], PGY-3  Phone: [phone]    Staff Involved:  Resident (Veena Power MD) and Staff (as above)    Staff Physician Comments:   I saw and evaluated the patient with the resident and I agree with the assessment and plan.  I was present for the examination.    Trevon Ayala MD  Pronouns: he/him/his    Department of Dermatology  [facility]: Phone: [phone], Fax:[phone]  [facility]: Phone: [phone] Fax: [phone]

## 2025-03-21 ASSESSMENT — SLEEP AND FATIGUE QUESTIONNAIRES
HOW LIKELY ARE YOU TO NOD OFF OR FALL ASLEEP WHILE SITTING AND TALKING TO SOMEONE: WOULD NEVER DOZE
HOW LIKELY ARE YOU TO NOD OFF OR FALL ASLEEP IN A CAR, WHILE STOPPED FOR A FEW MINUTES IN TRAFFIC: WOULD NEVER DOZE
HOW LIKELY ARE YOU TO NOD OFF OR FALL ASLEEP WHILE SITTING QUIETLY AFTER LUNCH WITHOUT ALCOHOL: WOULD NEVER DOZE
HOW LIKELY ARE YOU TO NOD OFF OR FALL ASLEEP WHILE WATCHING TV: WOULD NEVER DOZE
HOW LIKELY ARE YOU TO NOD OFF OR FALL ASLEEP WHEN YOU ARE A PASSENGER IN A CAR FOR AN HOUR WITHOUT A BREAK: WOULD NEVER DOZE
HOW LIKELY ARE YOU TO NOD OFF OR FALL ASLEEP WHILE SITTING INACTIVE IN A PUBLIC PLACE: WOULD NEVER DOZE
HOW LIKELY ARE YOU TO NOD OFF OR FALL ASLEEP WHILE LYING DOWN TO REST IN THE AFTERNOON WHEN CIRCUMSTANCES PERMIT: SLIGHT CHANCE OF DOZING
HOW LIKELY ARE YOU TO NOD OFF OR FALL ASLEEP WHILE SITTING AND READING: WOULD NEVER DOZE

## 2025-03-24 ENCOUNTER — VIRTUAL VISIT (OUTPATIENT)
Dept: SLEEP MEDICINE | Facility: CLINIC | Age: 65
End: 2025-03-24
Payer: COMMERCIAL

## 2025-03-24 VITALS — HEIGHT: 67 IN | WEIGHT: 133 LBS | BODY MASS INDEX: 20.88 KG/M2

## 2025-03-24 DIAGNOSIS — G47.9 SLEEP DISORDER: Primary | ICD-10-CM

## 2025-03-24 PROCEDURE — 1125F AMNT PAIN NOTED PAIN PRSNT: CPT | Mod: 95 | Performed by: PSYCHIATRY & NEUROLOGY

## 2025-03-24 PROCEDURE — 98005 SYNCH AUDIO-VIDEO EST LOW 20: CPT | Performed by: PSYCHIATRY & NEUROLOGY

## 2025-03-24 ASSESSMENT — PAIN SCALES - GENERAL: PAINLEVEL_OUTOF10: SEVERE PAIN (10)

## 2025-03-24 NOTE — NURSING NOTE
Current patient location: [facility] [address]  [place] [facility] [address]    Is the patient currently in the state of [place]? YES    Visit mode: VIDEO    If the visit is dropped, the patient can be reconnected by:VIDEO VISIT: Send to e-mail at: [email or web address] ([place]roppy)    Will anyone else be joining the visit? NO  (If patient encounters technical issues they should call [phone])    Are changes needed to the allergy or medication list? No    Are refills needed on medications prescribed by this physician? NO    Rooming Documentation:  Questionnaire(s) completed    Pt has had 8 lower back surgeries, MRI in Oct, had injection, then got worse, now has bulging disc    Reason for visit: RECHECK    Tabatha FLYNNF

## 2025-03-24 NOTE — PROGRESS NOTES
Virtual Visit Details    Type of service:  Video Visit     Originating Location (pt. Location): Home    Distant Location (provider location):  On-site  Platform used for Video Visit: [employer]    Brief sleep staff note    Discussed results of her sleep study which did not demonstrate excessive REM motor activity.  She was both relieved to hear this as well as she was not surprised.      Larger issue are ongoing back problems which she is continuing to work with her care team.     She did not pursue CBT-I but she has had some success with some cognitive distraction task.     She will return to see us on a PRN basis.     All questions were answered.    It is a great privilege being asked to participate in this patients care.  The patient has been advised on the importance in of never operating operating a motor vehicle while tired or sleepy.        I visited with the patient directly but also extensively reviewed chart and coordinated care. Total time spent in the care of this patient today (Face-to-Face time + chart time, , and coordination of care) was 20 minutes.

## 2025-08-16 ENCOUNTER — HEALTH MAINTENANCE LETTER (OUTPATIENT)
Age: 65
End: 2025-08-16